# Patient Record
Sex: FEMALE | Race: WHITE
[De-identification: names, ages, dates, MRNs, and addresses within clinical notes are randomized per-mention and may not be internally consistent; named-entity substitution may affect disease eponyms.]

---

## 2017-08-30 ENCOUNTER — HOSPITAL ENCOUNTER (INPATIENT)
Dept: HOSPITAL 74 - JER | Age: 62
LOS: 4 days | Discharge: HOME | DRG: 247 | End: 2017-09-03
Attending: INTERNAL MEDICINE | Admitting: INTERNAL MEDICINE
Payer: COMMERCIAL

## 2017-08-30 VITALS — BODY MASS INDEX: 27.1 KG/M2

## 2017-08-30 DIAGNOSIS — E03.9: ICD-10-CM

## 2017-08-30 DIAGNOSIS — K56.60: Primary | ICD-10-CM

## 2017-08-30 DIAGNOSIS — I10: ICD-10-CM

## 2017-08-30 DIAGNOSIS — Z98.84: ICD-10-CM

## 2017-08-30 DIAGNOSIS — K21.9: ICD-10-CM

## 2017-08-30 DIAGNOSIS — E11.9: ICD-10-CM

## 2017-08-30 LAB
ALBUMIN SERPL-MCNC: 3.8 G/DL (ref 3.4–5)
ALP SERPL-CCNC: 64 U/L (ref 45–117)
ALT SERPL-CCNC: 23 U/L (ref 12–78)
ANION GAP SERPL CALC-SCNC: 6 MMOL/L (ref 8–16)
APPEARANCE UR: CLEAR
APTT BLD: 36.4 SECONDS (ref 26.9–34.4)
AST SERPL-CCNC: 13 U/L (ref 15–37)
BASOPHILS # BLD: 0.3 % (ref 0–2)
BILIRUB SERPL-MCNC: 0.3 MG/DL (ref 0.2–1)
BILIRUB UR STRIP.AUTO-MCNC: NEGATIVE MG/DL
CALCIUM SERPL-MCNC: 9.1 MG/DL (ref 8.5–10.1)
CK SERPL-CCNC: 143 IU/L (ref 26–192)
CO2 SERPL-SCNC: 30 MMOL/L (ref 21–32)
COLOR UR: YELLOW
CREAT SERPL-MCNC: 0.4 MG/DL (ref 0.55–1.02)
DEPRECATED RDW RBC AUTO: 12.7 % (ref 11.6–15.6)
EOSINOPHIL # BLD: 0.1 % (ref 0–4.5)
GLUCOSE SERPL-MCNC: 91 MG/DL (ref 74–106)
INR BLD: 1.11 (ref 0.82–1.09)
KETONES UR QL STRIP: NEGATIVE
LEUKOCYTE ESTERASE UR QL STRIP.AUTO: NEGATIVE
MCH RBC QN AUTO: 30.4 PG (ref 25.7–33.7)
MCHC RBC AUTO-ENTMCNC: 33.6 G/DL (ref 32–36)
MCV RBC: 90.3 FL (ref 80–96)
NEUTROPHILS # BLD: 79.9 % (ref 42.8–82.8)
NITRITE UR QL STRIP: NEGATIVE
PH UR: 5 [PH] (ref 5–8)
PLATELET # BLD AUTO: 303 K/MM3 (ref 134–434)
PMV BLD: 7.4 FL (ref 7.5–11.1)
PROT SERPL-MCNC: 7.3 G/DL (ref 6.4–8.2)
PROT UR QL STRIP: NEGATIVE
PROT UR QL STRIP: NEGATIVE
PT PNL PPP: 12.2 SEC (ref 9.98–11.88)
RBC # UR STRIP: NEGATIVE /UL
SP GR UR: 1.02 (ref 1–1.02)
TROPONIN I SERPL-MCNC: < 0.02 NG/ML (ref 0–0.05)
UROBILINOGEN UR STRIP-MCNC: NEGATIVE MG/DL (ref 0.2–1)
WBC # BLD AUTO: 10.9 K/MM3 (ref 4–10)

## 2017-08-30 PROCEDURE — 0D9670Z DRAINAGE OF STOMACH WITH DRAINAGE DEVICE, VIA NATURAL OR ARTIFICIAL OPENING: ICD-10-PCS | Performed by: INTERNAL MEDICINE

## 2017-08-30 NOTE — PDOC
*Physical Exam





- Vital Signs


 Last Vital Signs











Temp Pulse Resp BP Pulse Ox


 


 98.8 F   75   20   152/98   100 


 


 08/30/17 12:55  08/30/17 12:55  08/30/17 12:55  08/30/17 12:55  08/30/17 12:55














ED Treatment Course





- LABORATORY


CBC & Chemistry Diagram: 


 08/31/17 06:00





 08/31/17 06:00





- ADDITIONAL ORDERS


Additional order review: 


 Laboratory  Results











  08/30/17 08/30/17 08/30/17





  15:00 15:00 15:00


 


INR   


 


PTT (Actin FS)   


 


Sodium   139 


 


Potassium   4.3 


 


Chloride   103 


 


Carbon Dioxide   30 


 


Anion Gap   6 L 


 


BUN   11  D 


 


Creatinine   0.4 L 


 


Creat Clearance w eGFR   > 60 


 


Random Glucose   91 


 


Calcium   9.1 


 


Total Bilirubin   0.3  D 


 


AST   13 L 


 


ALT   23 


 


Alkaline Phosphatase   64 


 


Total Protein   7.3 


 


Albumin   3.8 


 


Urine Color    Yellow


 


Urine Appearance    Clear


 


Urine pH    5.0  D


 


Ur Specific Gravity    1.020


 


Urine Protein    Negative


 


Urine Glucose (UA)    Negative


 


Urine Ketones    Negative


 


Urine Blood    Negative


 


Urine Nitrite    Negative


 


Urine Bilirubin    Negative


 


Urine Urobilinogen    Negative


 


Ur Leukocyte Esterase    Negative


 


Blood Type  B POSITIVE  


 


Antibody Screen  Negative  














  08/30/17





  15:00


 


INR  1.11


 


PTT (Actin FS)  36.4 H


 


Sodium 


 


Potassium 


 


Chloride 


 


Carbon Dioxide 


 


Anion Gap 


 


BUN 


 


Creatinine 


 


Creat Clearance w eGFR 


 


Random Glucose 


 


Calcium 


 


Total Bilirubin 


 


AST 


 


ALT 


 


Alkaline Phosphatase 


 


Total Protein 


 


Albumin 


 


Urine Color 


 


Urine Appearance 


 


Urine pH 


 


Ur Specific Gravity 


 


Urine Protein 


 


Urine Glucose (UA) 


 


Urine Ketones 


 


Urine Blood 


 


Urine Nitrite 


 


Urine Bilirubin 


 


Urine Urobilinogen 


 


Ur Leukocyte Esterase 


 


Blood Type 


 


Antibody Screen 








 











  08/30/17





  15:00


 


RBC  3.91


 


MCV  90.3


 


MCHC  33.6


 


RDW  12.7


 


MPV  7.4 L


 


Neutrophils %  79.9  D


 


Lymphocytes %  16.3  D


 


Monocytes %  3.4 L


 


Eosinophils %  0.1  D


 


Basophils %  0.3














- Medications


Given in the ED: 


ED Medications














Discontinued Medications














Generic Name Dose Route Start Last Admin





  Trade Name Freq  PRN Reason Stop Dose Admin


 


Morphine Sulfate  2 mg 08/30/17 17:13 08/30/17 17:24





  Morphine Injection -  IVPUSH 08/30/17 17:14  2 mg





  ONCE ONE   Administration














Medical Decision Making





- Medical Decision Making


08/30/17 19:49


agree with NP's evaluation, assessment, and plan.





62 F with chronic abdominal pain, SBOs, presenting with acute on chronic 

abdominal pain.


- CTAP


- PO trial


- Likely dispo home if CT negative





Pt signed out to night team, pending CTAP and PO trial





*DC/Admit/Observation/Transfer


Diagnosis at time of Disposition: 


 SBO (small bowel obstruction)





- Discharge Dispostion


Condition at time of disposition: Fair

## 2017-08-30 NOTE — PDOC
History of Present Illness





<Judy Pisano - Last Filed: 08/30/17 21:19>





- General


History Source: Patient


Exam Limitations: No Limitations





- History of Present Illness


Initial Comments: 


08/30/17 17:24











Chief complaint: Abdominal pain generalized








History of present illness: Patient is a 62-year-old female with a history of 

GERD, hypertension, diabetes, small bowel obstruction approximately 15 years 

ago. Patient reports that she was awaken this morning around 5 AM with mid 

abdominal discomfort that quickly started to radiate to entire abdomen with a 

cramping like sensation. Patient denies any fever, or chills. Patient reports 

that her last bowel movement was today to moderate sized brown formed with no 

rectal bleeding or blood noted on stool. Patient denies any urinary symptoms. 

Patient denies any back pain. Patient denies any difficulty breathing. Patient 

reports that her appetite has been very good.





08/30/17 17:28





Medical h/o gerd, htn, diabetes, small bowel obstruction 15 years ago


surgical h/o: thyroidectomy, cholecystectomy, appendectomy, hystertomy, 

oophorectomy, b/l hip replacement


GI: Dr Hayley pugh seen 5 years ago


PCP: Dr. Evie Hodge 





08/30/17 19:18





Timing/Duration: getting worse


Severity: severe (diffuse )


Associated Symptoms: reports: nausea/vomiting (nausea slight ).  denies: chest 

pain, cough, diaphoresis, fever/chills, headaches, malaise, seizure, shortness 

of breath, syncope, weakness





<Yanna Alexandre - Last Filed: 08/31/17 12:08>





- General


Chief Complaint: Pain


Stated Complaint: ABD PAIN


Time Seen by Provider: 08/30/17 16:51





Past History





<Judy Pisano - Last Filed: 08/30/17 21:19>





- Past Medical History


Diabetes: Yes


GI Disorders: Yes (gerd)


HTN: Yes


Suicide Attempt (Hx): No


Thyroid Disease: Yes





- Surgical History


Abdominal Surgery: Yes


Appendectomy: Yes


Cholecystectomy: Yes





- Psycho/Social/Smoking Cessation Hx


Anxiety: No


Suicidal Ideation: No


Smoking Status: No


Smoking History: Never smoked


Number of Cigarettes Smoked Daily: 0


Hx Alcohol Use: Yes (SOCIAL)


Drug/Substance Use Hx: No


Substance Use Type: None





<Yanna Alexandre - Last Filed: 08/31/17 12:08>





- Past Medical History


Allergies/Adverse Reactions: 


 Allergies











Allergy/AdvReac Type Severity Reaction Status Date / Time


 


No Known Allergies Allergy   Verified 08/30/17 12:58











Home Medications: 


Ambulatory Orders





Levothyroxine [Synthroid -] 100 mcg PO DAILY 08/30/17 


Tramadol HCl 50 mg PO DAILY PRN 08/30/17 


Valsartan [Diovan] 80 mg PO DAILY 08/30/17 











**Review of Systems





- Review of Systems


Able to Perform ROS?: Yes


Constitutional: No: Symptoms Reported


HEENTM: No: Symptoms Reported


Respiratory: No: Symptoms reported


Cardiac (ROS): No: Symptoms Reported


ABD/GI: Yes: Nausea (slight today ), Abdominal cramping (diffuse), Other (mid 

epigastric discomfort starting at 5 am started to radiate diffusely with 

cramping generalized abdominal pain).  No: Abdominal Distended, Abd. Pain w/ 

defecation, Blood Streaked Bowels, Constipated, Diarrhea, Difficulty Swallowing

, Poor Appetite, Poor Fluid Intake, Rectal Bleeding, Vomiting, Indigestion, 

Tarry Stools


: No: Symptoms Reported


Musculoskeletal: No: Symptoms Reported


Integumentary: No: Symptoms Reported


Neurological: No: Symptoms reported





<Yanna Alexandre - Last Filed: 08/31/17 12:08>





*Physical Exam





- Vital Signs


 Last Vital Signs











Temp Pulse Resp BP Pulse Ox


 


 97.3 F L  57 L  16   153/79   99 


 


 08/30/17 17:59  08/30/17 17:59  08/30/17 17:59  08/30/17 17:59  08/30/17 17:59














<Judy Pisano - Last Filed: 08/30/17 21:19>





- Vital Signs


 Last Vital Signs











Temp Pulse Resp BP Pulse Ox


 


 98.8 F   75   20   152/98   100 


 


 08/30/17 12:55  08/30/17 12:55  08/30/17 12:55  08/30/17 12:55  08/30/17 12:55














- Physical Exam


General Appearance: Yes: Appropriately Dressed


Respiratory/Chest: positive: Lungs Clear, Normal Breath Sounds.  negative: 

Chest Tender, Respiratory Distress


Cardiovascular: positive: Regular Rhythm, Regular Rate, S1, S2


Gastrointestinal/Abdominal: positive: Normal Bowel Sounds, Tender (diffusely 

greater left lower quadrant), Soft, Tenderness.  negative: Organomegaly, 

Increased Bowel Sounds, Decreased BS, Distended, Guarding, Rebound, Hepatomegaly

, Spleenomegaly


Musculoskeletal: negative: CVA Tenderness, CVA Tenderness (R), CVA Tenderness (L

)


Integumentary: positive: Normal Color


Neurologic: positive: Alert, Normal Response, Finger to Nose





<Yanna Alexandre - Last Filed: 08/31/17 12:08>





ED Treatment Course





- LABORATORY


CBC & Chemistry Diagram: 


 08/30/17 15:00





 08/30/17 15:00





- ADDITIONAL ORDERS


Additional order review: 


 Laboratory  Results











  08/30/17 08/30/17 08/30/17





  17:38 15:00 15:00


 


INR   


 


PTT (Actin FS)   


 


Sodium    139


 


Potassium    4.3


 


Chloride    103


 


Carbon Dioxide    30


 


Anion Gap    6 L


 


BUN    11  D


 


Creatinine    0.4 L


 


Creat Clearance w eGFR    > 60


 


Random Glucose    91


 


Calcium    9.1


 


Total Bilirubin    0.3  D


 


AST    13 L


 


ALT    23


 


Alkaline Phosphatase    64


 


Creatine Kinase  143  


 


Troponin I  < 0.02  


 


Total Protein    7.3


 


Albumin    3.8


 


Urine Color   


 


Urine Appearance   


 


Urine pH   


 


Ur Specific Gravity   


 


Urine Protein   


 


Urine Glucose (UA)   


 


Urine Ketones   


 


Urine Blood   


 


Urine Nitrite   


 


Urine Bilirubin   


 


Urine Urobilinogen   


 


Ur Leukocyte Esterase   


 


Blood Type   B POSITIVE 


 


Antibody Screen   Negative 














  08/30/17 08/30/17





  15:00 15:00


 


INR   1.11


 


PTT (Actin FS)   36.4 H


 


Sodium  


 


Potassium  


 


Chloride  


 


Carbon Dioxide  


 


Anion Gap  


 


BUN  


 


Creatinine  


 


Creat Clearance w eGFR  


 


Random Glucose  


 


Calcium  


 


Total Bilirubin  


 


AST  


 


ALT  


 


Alkaline Phosphatase  


 


Creatine Kinase  


 


Troponin I  


 


Total Protein  


 


Albumin  


 


Urine Color  Yellow 


 


Urine Appearance  Clear 


 


Urine pH  5.0  D 


 


Ur Specific Gravity  1.020 


 


Urine Protein  Negative 


 


Urine Glucose (UA)  Negative 


 


Urine Ketones  Negative 


 


Urine Blood  Negative 


 


Urine Nitrite  Negative 


 


Urine Bilirubin  Negative 


 


Urine Urobilinogen  Negative 


 


Ur Leukocyte Esterase  Negative 


 


Blood Type  


 


Antibody Screen  








 











  08/30/17





  15:00


 


RBC  3.91


 


MCV  90.3


 


MCHC  33.6


 


RDW  12.7


 


MPV  7.4 L


 


Neutrophils %  79.9  D


 


Lymphocytes %  16.3  D


 


Monocytes %  3.4 L


 


Eosinophils %  0.1  D


 


Basophils %  0.3














- Medications


Given in the ED: 


ED Medications














Discontinued Medications














Generic Name Dose Route Start Last Admin





  Trade Name Freq  PRN Reason Stop Dose Admin


 


Morphine Sulfate  2 mg 08/30/17 17:13 08/30/17 17:24





  Morphine Injection -  IVPUSH 08/30/17 17:14  2 mg





  ONCE ONE   Administration














<Judy Pisano - Last Filed: 08/30/17 21:19>





- LABORATORY


CBC & Chemistry Diagram: 


 08/31/17 06:00





 08/31/17 06:00





- ADDITIONAL ORDERS


Additional order review: 


 Laboratory  Results











  08/30/17 08/30/17 08/30/17





  15:00 15:00 15:00


 


INR    1.11


 


PTT (Actin FS)    36.4 H


 


Sodium  139  


 


Potassium  4.3  


 


Chloride  103  


 


Carbon Dioxide  30  


 


Anion Gap  6 L  


 


BUN  11  D  


 


Creatinine  0.4 L  


 


Creat Clearance w eGFR  > 60  


 


Random Glucose  91  


 


Calcium  9.1  


 


Total Bilirubin  0.3  D  


 


AST  13 L  


 


ALT  23  


 


Alkaline Phosphatase  64  


 


Total Protein  7.3  


 


Albumin  3.8  


 


Urine Color   Yellow 


 


Urine Appearance   Clear 


 


Urine pH   5.0  D 


 


Urine Protein   Negative 


 


Urine Glucose (UA)   Negative 


 


Urine Ketones   Negative 


 


Urine Blood   Negative 


 


Urine Nitrite   Negative 


 


Urine Bilirubin   Negative 


 


Urine Urobilinogen   Negative 


 


Ur Leukocyte Esterase   Negative 








 











  08/30/17





  15:00


 


RBC  3.91


 


MCV  90.3


 


MCHC  33.6


 


RDW  12.7


 


MPV  7.4 L


 


Neutrophils %  79.9  D


 


Lymphocytes %  16.3  D


 


Monocytes %  3.4 L


 


Eosinophils %  0.1  D


 


Basophils %  0.3














<Yanna Alexandre - Last Filed: 08/31/17 12:08>





Medical Decision Making





- Medical Decision Making


08/30/17 17:28


 Patient is a 62-year-old female with a history of GERD, hypertension, diabetes

, small bowel obstruction approximately 15 years ago. Patient reports that she 

was awaken this morning around 5 AM with mid abdominal discomfort that quickly 

started to radiate to entire abdomen with a cramping like sensation. Patient 

denies any fever, or chills. Patient reports that her last bowel movement was 

today to moderate sized brown formed with no rectal bleeding or blood noted on 

stool. Patient denies any urinary symptoms. Patient denies any back pain. 

Patient denies any difficulty breathing. Patient reports that her appetite has 

been very good. Differential diagnosis will be r/o small bowel obstruction, 

diverticulitis  








Diffuse abdominal pain r/o SMALL BOWEL OBSTRUCTION/ DIVERTICULITIS 











PLAN: 


cbc with diff 


cmp


u/a 


Pt/INR


cardiac profile


EKG reviewed by Dr. Flores





CT abdomen with po and IV contrast


morphine  2m IV push 


IV insert





08/30/17 17:29








08/30/17 17:33








08/30/17 17:33


 Laboratory Tests











  08/30/17 08/30/17 08/30/17





  15:00 15:00 15:00


 


WBC  10.9 H D  


 


RBC  3.91  


 


Hgb  11.9  


 


Hct  35.3  


 


MCV  90.3  


 


MCH  30.4  


 


MCHC  33.6  


 


RDW  12.7  


 


Plt Count  303  


 


MPV  7.4 L  


 


Neutrophils %  79.9  D  


 


Lymphocytes %  16.3  D  


 


Monocytes %  3.4 L  


 


Eosinophils %  0.1  D  


 


Basophils %  0.3  


 


INR   1.11 


 


PTT (Actin FS)   36.4 H 


 


Sodium   


 


Potassium   


 


Chloride   


 


Carbon Dioxide   


 


Anion Gap   


 


BUN   


 


Creatinine   


 


Creat Clearance w eGFR   


 


Random Glucose   


 


Calcium   


 


Total Bilirubin   


 


ALT   


 


Alkaline Phosphatase   


 


Total Protein   


 


Albumin   


 


Urine Color    Yellow


 


Urine Appearance    Clear


 


Urine pH    5.0  D


 


Urine Protein    Negative


 


Urine Glucose (UA)    Negative


 


Urine Ketones    Negative


 


Urine Blood    Negative


 


Urine Nitrite    Negative


 


Urine Bilirubin    Negative


 


Urine Urobilinogen    Negative


 


Ur Leukocyte Esterase    Negative














  08/30/17





  15:00


 


WBC 


 


RBC 


 


Hgb 


 


Hct 


 


MCV 


 


MCH 


 


MCHC 


 


RDW 


 


Plt Count 


 


MPV 


 


Neutrophils % 


 


Lymphocytes % 


 


Monocytes % 


 


Eosinophils % 


 


Basophils % 


 


INR 


 


PTT (Actin FS) 


 


Sodium  139


 


Potassium  4.3


 


Chloride  103


 


Carbon Dioxide  30


 


Anion Gap  6 L


 


BUN  11  D


 


Creatinine  0.4 L


 


Creat Clearance w eGFR  > 60


 


Random Glucose  91


 


Calcium  9.1


 


Total Bilirubin  0.3  D


 


ALT  23


 


Alkaline Phosphatase  64


 


Total Protein  7.3


 


Albumin  3.8


 


Urine Color 


 


Urine Appearance 


 


Urine pH 


 


Urine Protein 


 


Urine Glucose (UA) 


 


Urine Ketones 


 


Urine Blood 


 


Urine Nitrite 


 


Urine Bilirubin 


 


Urine Urobilinogen 


 


Ur Leukocyte Esterase 














08/30/17 19:00


 Laboratory Tests











  08/30/17





  17:38


 


Creatine Kinase  143


 


Troponin I  < 0.02














08/30/17 19:20


pt. signed out to OSWALDO Pisano CT of abdomen pending 





08/31/17 12:08








<Yanna Alexandre - Last Filed: 08/31/17 12:08>





*DC/Admit/Observation/Transfer





- Discharge Dispostion


Admit: Yes





<Judy Pisano - Last Filed: 08/30/17 21:19>





<Yanna Alexandre - Last Filed: 08/31/17 12:08>


Diagnosis at time of Disposition: 


 SBO (small bowel obstruction)





- Discharge Dispostion


Condition at time of disposition: Fair

## 2017-08-31 LAB
ALBUMIN SERPL-MCNC: 3.2 G/DL (ref 3.4–5)
ALP SERPL-CCNC: 69 U/L (ref 45–117)
ALT SERPL-CCNC: 23 U/L (ref 12–78)
ANION GAP SERPL CALC-SCNC: 6 MMOL/L (ref 8–16)
AST SERPL-CCNC: 13 U/L (ref 15–37)
BASOPHILS # BLD: 0.3 % (ref 0–2)
BILIRUB SERPL-MCNC: 0.6 MG/DL (ref 0.2–1)
CALCIUM SERPL-MCNC: 8.2 MG/DL (ref 8.5–10.1)
CO2 SERPL-SCNC: 30 MMOL/L (ref 21–32)
CREAT SERPL-MCNC: 0.4 MG/DL (ref 0.55–1.02)
DEPRECATED RDW RBC AUTO: 12.9 % (ref 11.6–15.6)
EOSINOPHIL # BLD: 0.9 % (ref 0–4.5)
GLUCOSE SERPL-MCNC: 91 MG/DL (ref 74–106)
MCH RBC QN AUTO: 30.4 PG (ref 25.7–33.7)
MCHC RBC AUTO-ENTMCNC: 33.6 G/DL (ref 32–36)
MCV RBC: 90.3 FL (ref 80–96)
NEUTROPHILS # BLD: 69 % (ref 42.8–82.8)
PLATELET # BLD AUTO: 283 K/MM3 (ref 134–434)
PMV BLD: 6.9 FL (ref 7.5–11.1)
PROT SERPL-MCNC: 6.4 G/DL (ref 6.4–8.2)
WBC # BLD AUTO: 7.4 K/MM3 (ref 4–10)

## 2017-08-31 RX ADMIN — HYDROMORPHONE HYDROCHLORIDE PRN MG: 1 INJECTION, SOLUTION INTRAMUSCULAR; INTRAVENOUS; SUBCUTANEOUS at 00:08

## 2017-08-31 RX ADMIN — SODIUM CHLORIDE SCH MLS/HR: 9 INJECTION, SOLUTION INTRAVENOUS at 00:08

## 2017-08-31 RX ADMIN — HYDROMORPHONE HYDROCHLORIDE PRN MG: 1 INJECTION, SOLUTION INTRAMUSCULAR; INTRAVENOUS; SUBCUTANEOUS at 06:08

## 2017-08-31 NOTE — EKG
Test Reason : 

Blood Pressure : ***/*** mmHG

Vent. Rate : 061 BPM     Atrial Rate : 061 BPM

   P-R Int : 192 ms          QRS Dur : 098 ms

    QT Int : 424 ms       P-R-T Axes : -06 -07 015 degrees

   QTc Int : 426 ms

 

NORMAL SINUS RHYTHM

SEPTAL INFARCT , AGE UNDETERMINED

ABNORMAL ECG

WHEN COMPARED WITH ECG OF 19-MAY-2012 09:04,

SEPTAL INFARCT IS NOW PRESENT

Confirmed by CARLOS DUPREE, KEVIN ([id number]) on 8/31/2017 11:13:23 AM

 

Referred By:             Confirmed By:KEVIN GONZALEZ MD

## 2017-08-31 NOTE — PN
Teaching Attending Note





ATTENDING PHYSICIAN STATEMENT





I saw and evaluated the patient.


I reviewed the resident's note and discussed the case with the resident.


I agree with the resident's findings and plan as documented.








SUBJECTIVE:








OBJECTIVE:








ASSESSMENT AND PLAN:

## 2017-08-31 NOTE — HP
CHIEF COMPLAINT: Abdominal discomfort





PCP:Dr. Evie Hodge 





HISTORY OF PRESENT ILLNESS:


62 y.o. F with pmh of GERD, HTN, SBO 15 years prior presenting with abdominal 

discomfort. Patient woke up at 5 am with epigastric pain. She drank black 

coffee and then around 9 am, she felt her pain radiate across entire abdomen. 

Patient states the pain is crampy and constant. Patient felt it was the same as 

when she had a SBO 15 years ago. Her las BM was this morning x2. Her last meal 

was last night for dinner. She has a good appetite. She endorses chills, mild 

nausea. Denies fever, vomiting, diarrhea, constipation, changes in her bowels 

or bladder





ER course was notable for:


(1) WBC - 10.9, UA negative


(2) Abd/pelvis CT- dilated loops of small bowel-- SBO


(3) NG tube placement





Recent Travel: denies





PAST MEDICAL HISTORY: GERD, HTN, SBO 15 years prior





PAST SURGICAL HISTORY: thyroidectomy, appendectomy, cholecystectomy, total 

hysterectomy with oopherectomy, and b/l hip replacements





Social History:


Smoking: denies


Alcohol: occasional


Drugs: denies





Family History: HTN in mother


Allergies





No Known Allergies Allergy (Verified 08/30/17 12:58)


 








HOME MEDICATIONS:


 Home Medications











 Medication  Instructions  Recorded


 


Levothyroxine [Synthroid -] 100 mcg PO DAILY 08/30/17


 


Tramadol HCl 50 mg PO DAILY PRN 08/30/17


 


Valsartan [Diovan] 80 mg PO DAILY 08/30/17








REVIEW OF SYSTEMS


CONSTITUTIONAL: 


Absent:  fever, chills, diaphoresis, generalized weakness, malaise, loss of 

appetite, weight change


HEENT: 


Absent:  rhinorrhea, nasal congestion, throat pain, throat swelling, difficulty 

swallowing, mouth swelling, ear pain, eye pain, visual changes


CARDIOVASCULAR: 


Absent: chest pain, syncope, palpitations, irregular heart rate, lightheadedness

, peripheral edema


RESPIRATORY: 


Absent: cough, shortness of breath, dyspnea with exertion, orthopnea, wheezing, 

stridor, hemoptysis


GASTROINTESTINAL:


Absent: abdominal pain, abdominal distension, nausea, vomiting, diarrhea, 

constipation, melena, hematochezia


GENITOURINARY: 


Absent: dysuria, frequency, urgency, hesitancy, hematuria, flank pain, genital 

pain


MUSCULOSKELETAL: 


Absent: myalgia, arthralgia, joint swelling, back pain, neck pain


SKIN: 


Absent: rash, itching, pallor


HEMATOLOGIC/IMMUNOLOGIC: 


Absent: easy bleeding, easy bruising, lymphadenopathy, frequent infections


ENDOCRINE:


Absent: unexplained weight gain, unexplained weight loss, heat intolerance, 

cold intolerance


NEUROLOGIC: 


Absent: headache, focal weakness or paresthesias, dizziness, unsteady gait, 

seizure, mental status changes, bladder or bowel incontinence


PSYCHIATRIC: 


Absent: anxiety, depression, suicidal or homicidal ideation, hallucinations.








PHYSICAL EXAMINATION


 Vital Signs - 24 hr











  08/30/17





  23:14


 


Temperature 98.3 F


 


Pulse Rate 55 L


 


Respiratory 20





Rate 


 


Blood Pressure 151/74


 


O2 Sat by Pulse 96





Oximetry (%) 











GENERAL: Awake, alert, and fully oriented, in no acute distress.


HEAD: Normal with no signs of trauma.


EYES: Pupils equal, round and reactive to light, extraocular movements intact, 

sclera anicteric, conjunctiva clear. No lid lag.


EARS, NOSE, THROAT: Ears normal, nares patent, oropharynx clear without 

exudates. Moist mucous membranes.


NECK: Normal range of motion, supple without lymphadenopathy, JVD, or masses.


LUNGS: Breath sounds equal, clear to auscultation bilaterally. No wheezes, and 

no crackles. No accessory muscle use.


HEART: Regular rate and rhythm, normal S1 and S2 without murmur, rub or gallop.


ABDOMEN: Soft, Diffuse tenderness to palpation and percussion, not distended, 

normoactive bowel sounds, no guarding, no rebound, no masses.  No hepatomegaly 

or  splenomegaly. 


MUSCULOSKELETAL: Normal range of motion at all joints. No bony deformities or 

tenderness. No CVA tenderness.


UPPER EXTREMITIES: 2+ pulses, warm, well-perfused. No cyanosis. No clubbing. No 

peripheral edema.


LOWER EXTREMITIES: 2+ pulses, warm, well-perfused. No calf tenderness. No 

peripheral edema. 


NEUROLOGICAL:  Cranial nerves II-XII intact. Normal speech. Normal gait.


PSYCHIATRIC: Cooperative. Good eye contact. Appropriate mood and affect.


SKIN: Warm, dry, normal turgor, no rashes or lesions noted, normal capillary 

refill. 


 Laboratory Results - last 24 hr











  08/30/17





  22:50


 


Lactic Acid  0.6











ASSESSMENT/PLAN:


62 year old F with pmh of GERD, HTN, SBO 15 yrs ago presented with diffuse 

abdominal pain admitted for SBO.





#SBO


-IVF @ 125 cc/hr NS


-NPO


-0.5 mg dilaudid Q6 hr PRN for pain control


-Zofran 4 mg Q6 hr PRN


-GI consulted, Dr. Hardy


-NG tube in place to low intermittent suction





#HTN


-Lopressor 5 mg IVP q6h prn, maintain sbp <140


-Hold home medications





#FEN/GI


-IVF @ 125 cc/hr NS


-wnl


-NPO





#ppx


-DVT- SCD's





Visit type





- Emergency Visit


Emergency Visit: Yes


ED Registration Date: 08/30/17


Care time: The patient presented to the Emergency Department on the above date 

and was hospitalized for further evaluation of their emergent condition.





- New Patient


This patient is new to me today: Yes


Date on this admission: 09/03/17





- Critical Care


Critical Care patient: No

## 2017-08-31 NOTE — PN
Progress Note, Physician


Chief Complaint: 


Ms Melissa says she is feeling a little bit better. Says her abdominal pain 

is improved but still present. No cp or sob. +flatus this am.





- Current Medication List


Current Medications: 


Active Medications





Hydromorphone HCl (Dilaudid Injection -)  0.5 mg IVPUSH Q6H PRN


   PRN Reason: PAIN


   Last Admin: 08/31/17 06:08 Dose:  0.5 mg


Sodium Chloride (Normal Saline -)  1,000 mls @ 125 mls/hr IV ASDIR CATHIE


   Last Admin: 08/31/17 00:08 Dose:  125 mls/hr


Metoprolol Tartrate (Lopressor Injection -)  5 mg IVPB Q6H PRN


   PRN Reason: HYPERTENSION


Ondansetron HCl (Zofran Injection)  4 mg IVPUSH Q6H PRN


   PRN Reason: NAUSEA AND/OR VOMITING











- Objective


Vital Signs: 


 Vital Signs











Temperature  37.1 C   08/31/17 09:58


 


Pulse Rate  62   08/31/17 09:58


 


Respiratory Rate  20   08/31/17 09:58


 


Blood Pressure  148/71   08/31/17 09:58


 


O2 Sat by Pulse Oximetry (%)  96   08/30/17 23:14











Constitutional: Yes: Well Nourished, No Distress, Calm


Cardiovascular: Yes: Regular Rate and Rhythm.  No: Gallop, Murmur, Rub


Respiratory: Yes: Regular, CTA Bilaterally.  No: Rales, Rhonchi, Wheezes


Gastrointestinal: Yes: Soft, Hypoactive Bowel Sounds, Tenderness.  No: Normal 

Bowel Sounds, Distention


Extremities: Yes: WNL


Edema: No


Labs: 


 CBC, BMP





 08/31/17 06:00 





 08/31/17 06:00 





 INR, PTT











INR  1.11  (0.82-1.09)   08/30/17  15:00    














Problem List





- Problems


(1) SBO (small bowel obstruction)


Assessment/Plan: 


-patient slightly improved


-continue NPO


-continue NGT to LIWS


-case d/w surgery, no need for surgical intervention at this time


-GI consulted


Code(s): K56.69 - OTHER INTESTINAL OBSTRUCTION





(2) Hypertension


Assessment/Plan: 


-prn IV metoprolol while npo


-restart home regimen when placed on diet


Code(s): I10 - ESSENTIAL (PRIMARY) HYPERTENSION   





(3) Hypothyroid


Assessment/Plan: 


-ok to hold synthroid for short time secondary to long half life


-if long period where needs to be npo, start IV levothyroxine


Code(s): E03.9 - HYPOTHYROIDISM, UNSPECIFIED

## 2017-08-31 NOTE — CONSULT
- Consultation


REQUESTING PROVIDER: 





CONSULT REQUEST: We have been asked to surgically evaluate this patient for 

abdominal pain


PCP:Jacky Umana MD





HISTORY OF PRESENT ILLNESS: CTSP for evaluation and management of SBO; patient 

has had # abdominal surgeries in the past; she came in b/o crampy abdominal pain

; she states she is passing flatus.





PMHx:    hypothyroid; hypertension      





PSHx:     open martha; appendectomy; sleeve gastrectomy; DOMO; incisional hernia 

repair; C-S; b/l THR and previous h/o SBO  


 Home Medications











 Medication  Instructions  Recorded


 


Levothyroxine [Synthroid -] 100 mcg PO DAILY 08/30/17


 


Tramadol HCl 50 mg PO DAILY PRN 08/30/17


 


Valsartan [Diovan] 80 mg PO DAILY 08/30/17








 Allergies











Allergy/AdvReac Type Severity Reaction Status Date / Time


 


No Known Allergies Allergy   Verified 08/30/17 12:58











PHYSICAL EXAM:


GENERAL: Awake, alert, and fully oriented, in no acute distress.


HEAD: Normal with no signs of trauma.


EYES: PERRL, sclera anicteric, conjunctiva clear.NECK: Normal ROM, supple 

without lymphadenopathy, JVD, or masses.


ABDOMEN: Soft, nontender, not distended, hypooactive bowel sounds, no guarding, 

no rebound, no masses.  No organomegaly. No hernias; healed surgical scars


MUSCULOSKELETAL: Normal ROM at all joints. No bony deformities or tenderness. 

No CVA tenderness.


UPPER EXTREMITIES: 2+ pulses, warm, well-perfused. No cyanosis. Cap refill <2 

seconds. No peripheral edema.


LOWER EXTREMITIES: 2+ pulses, warm, well-perfused. No calf tenderness. No 

peripheral edema. 


NEUROLOGICAL: Normal speech, gait not observed.


PSYCH: Cooperative. Good eye contact. Appropriate mood and affect.


SKIN: Warm, dry, normal turgor, no rashes or lesions noted.


 Vital Signs











Temperature  98.7 F   08/31/17 09:58


 


Pulse Rate  62   08/31/17 09:58


 


Respiratory Rate  20   08/31/17 09:58


 


Blood Pressure  148/71   08/31/17 09:58


 


O2 Sat by Pulse Oximetry (%)  96   08/30/17 23:14








 Lab Results











WBC  7.4 K/mm3 (4.0-10.0)  D 08/31/17  06:00    


 


RBC  3.73 M/mm3 (3.60-5.2)   08/31/17  06:00    


 


Hgb  11.3 GM/dL (10.7-15.3)   08/31/17  06:00    


 


Hct  33.7 % (32.4-45.2)   08/31/17  06:00    


 


MCV  90.3 fl (80-96)   08/31/17  06:00    


 


MCHC  33.6 g/dl (32.0-36.0)   08/31/17  06:00    


 


RDW  12.9 % (11.6-15.6)   08/31/17  06:00    


 


Plt Count  283 K/MM3 (134-434)   08/31/17  06:00    


 


Sodium  142 mmol/L (136-145)   08/31/17  06:00    


 


Potassium  3.7 mmol/L (3.5-5.1)   08/31/17  06:00    


 


Chloride  106 mmol/L ()   08/31/17  06:00    


 


Carbon Dioxide  30 mmol/L (21-32)   08/31/17  06:00    


 


Anion Gap  6  (8-16)  L  08/31/17  06:00    


 


BUN  7 mg/dL (7-18)  D 08/31/17  06:00    


 


Creatinine  0.4 mg/dL (0.55-1.02)  L  08/31/17  06:00    


 


Random Glucose  91 mg/dL ()   08/31/17  06:00    


 


Calcium  8.2 mg/dL (8.5-10.1)  L  08/31/17  06:00    


 


Blood Type  B POSITIVE   08/30/17  15:00    


 


Antibody Screen  Negative   08/30/17  15:00    


 


INR  1.11  (0.82-1.09)   08/30/17  15:00    








CT a/p reviewed





IMP: partial vs. early SBO





PLAN: NPO/NGT/IVF; serial abdominal exams; f/u AXR ordered; plan of care d/w 

patient; surgery is possibly necessary if no resolution of sbo w/ conservative 

tx.; a/a/u by the patient.











Ricky Nathan MD FACS





Visit type





- Case Type


Case Type: ED Admission





- Emergency


Emergency Visit: Yes


ED Registration Date: 08/30/17


Care time: The patient presented to the Emergency Department on the above date 

and was hospitalized for further evaluation of their emergent condition.





- New patient


This patient is new to me today: Yes


Date on this admission: 08/31/17





- Critical Care


Critical Care patient: No

## 2017-09-01 LAB
ANION GAP SERPL CALC-SCNC: 12 MMOL/L (ref 8–16)
BASOPHILS # BLD: 0.3 % (ref 0–2)
CALCIUM SERPL-MCNC: 8.2 MG/DL (ref 8.5–10.1)
CO2 SERPL-SCNC: 24 MMOL/L (ref 21–32)
CREAT SERPL-MCNC: 0.3 MG/DL (ref 0.55–1.02)
DEPRECATED RDW RBC AUTO: 12.8 % (ref 11.6–15.6)
EOSINOPHIL # BLD: 0.7 % (ref 0–4.5)
GLUCOSE SERPL-MCNC: 60 MG/DL (ref 74–106)
MAGNESIUM SERPL-MCNC: 1.8 MG/DL (ref 1.8–2.4)
MCH RBC QN AUTO: 30.2 PG (ref 25.7–33.7)
MCHC RBC AUTO-ENTMCNC: 33.4 G/DL (ref 32–36)
MCV RBC: 90.6 FL (ref 80–96)
NEUTROPHILS # BLD: 71.8 % (ref 42.8–82.8)
PHOSPHATE SERPL-MCNC: 3.2 MG/DL (ref 2.5–4.9)
PLATELET # BLD AUTO: 263 K/MM3 (ref 134–434)
PMV BLD: 7.3 FL (ref 7.5–11.1)
WBC # BLD AUTO: 8.1 K/MM3 (ref 4–10)

## 2017-09-01 RX ADMIN — SODIUM CHLORIDE SCH MLS/HR: 9 INJECTION, SOLUTION INTRAVENOUS at 03:34

## 2017-09-01 RX ADMIN — SODIUM CHLORIDE SCH MLS/HR: 9 INJECTION, SOLUTION INTRAVENOUS at 12:10

## 2017-09-01 RX ADMIN — SODIUM CHLORIDE SCH MLS/HR: 9 INJECTION, SOLUTION INTRAVENOUS at 20:03

## 2017-09-01 NOTE — CON.GI
Consult


Consult Specialty:: GI


Referred by:: Dr Umana


Reason for Consultation:: SBO





- History of Present Illness


History of Present Illness: 


62-year-old female with a history of GERD, hypertension, diabetes, small bowel 

obstruction approximately 15 years ago s/p gastric sleeve surgery 5 years ago 

now with resolving SBO. On admission, she was distended and had abdominal pain. 

Radiology showed SBO. At this time, problem has resolved, NGT is out, and she 

is tolerating clear liquids.








- History Source


History Provided By: Patient, Medical Record


Limitations to Obtaining History: No Limitations





- Past Medical History


...Pregnant: No





- Alcohol/Substance Use


Hx Alcohol Use: Yes (SOCIAL)





- Smoking History


Smoking history: Never smoked


Have you smoked in the past 12 months: No


Aproximately how many cigarettes per day: 0





Home Medications





- Allergies


Allergies/Adverse Reactions: 


 Allergies











Allergy/AdvReac Type Severity Reaction Status Date / Time


 


No Known Allergies Allergy   Verified 08/30/17 12:58














- Home Medications


Home Medications: 


Ambulatory Orders





Levothyroxine [Synthroid -] 100 mcg PO DAILY 08/30/17 


Tramadol HCl 50 mg PO DAILY PRN 08/30/17 


Valsartan [Diovan] 80 mg PO DAILY 08/30/17 











Physical Exam-GI


Vital Signs: 


 Vital Signs











Temperature  99.1 F   09/01/17 13:56


 


Pulse Rate  61   09/01/17 13:56


 


Respiratory Rate  18   09/01/17 13:56


 


Blood Pressure  135/62   09/01/17 13:56


 


O2 Sat by Pulse Oximetry (%)  98   09/01/17 09:00











Constitutional: Yes: Well Nourished, No Distress


Neck: Yes: Supple


Cardiovascular: Yes: Regular Rate and Rhythm


Respiratory: Yes: CTA Bilaterally


Gastrointestinal Inspection: Yes: WNL


...Auscultate: Yes: Normoactive Bowel Sounds


...Palpate: Yes: Soft.  No: Tenderness


Labs: 


 CBC, BMP





 09/01/17 06:00 





 09/01/17 06:00 





 INR, PTT











INR  1.11  (0.82-1.09)   08/30/17  15:00    














Imaging





- Results


X-ray: Report Reviewed (resolving SBO)





Assessment/Plan


62-F with SBO now resolvingl, presumably secondary to adhesions. 


Advance to full liquid. 


If tolerated can d/c with f/u with Dr Warren

## 2017-09-01 NOTE — PN
Progress Note (short form)





- Note


Progress Note: 


Attending Surgeon





No c/o; passing flatus and having bowel movements





VSS AF abdomen-soft; flat and non tender; no tympany; o/w negative





AXR yesterday-contrast in colon





IMP: resolvong partial SBO





PLAN: NGT removed; trial of clear liquids and advance diet as tolerated.





Ricky Nathan MD FACS

## 2017-09-01 NOTE — PN
Progress Note, Physician


Chief Complaint: 


Ms Melissa says she is feeling much better. NGT removed and patient 

tolerating clear liquid diet. +flatus and +bm. No cp, sob, n/v.





- Current Medication List


Current Medications: 


Active Medications





Hydromorphone HCl (Dilaudid Injection -)  0.5 mg IVPUSH Q6H PRN


   PRN Reason: PAIN


   Last Admin: 08/31/17 06:08 Dose:  0.5 mg


Sodium Chloride (Normal Saline -)  1,000 mls @ 125 mls/hr IV ASDIR [facility]


   Last Admin: 09/01/17 12:10 Dose:  125 mls/hr


Metoprolol Tartrate (Lopressor Injection -)  5 mg IVPB Q6H PRN


   PRN Reason: HYPERTENSION


Ondansetron HCl (Zofran Injection)  4 mg IVPB Q6H PRN


   PRN Reason: NAUSEA AND/OR VOMITING











- Objective


Vital Signs: 


 Vital Signs











Temperature  37.3 C   09/01/17 13:56


 


Pulse Rate  61   09/01/17 13:56


 


Respiratory Rate  18   09/01/17 13:56


 


Blood Pressure  135/62   09/01/17 13:56


 


O2 Sat by Pulse Oximetry (%)  98   09/01/17 09:00











Constitutional: Yes: Well Nourished, No Distress, Calm


Cardiovascular: Yes: Regular Rate and Rhythm.  No: Gallop, Murmur, Rub


Respiratory: Yes: Regular, CTA Bilaterally.  No: Rales, Rhonchi, Wheezes


Gastrointestinal: Yes: Normal Bowel Sounds, Soft.  No: Distention, Tenderness


Extremities: Yes: WNL


Edema: No


Labs: 


 CBC, BMP





 09/01/17 06:00 





 09/01/17 06:00 





 INR, PTT











INR  1.11  (0.82-1.09)   08/30/17  15:00    














Problem List





- Problems


(1) SBO (small bowel obstruction)


Code(s): K56.69 - OTHER INTESTINAL OBSTRUCTION





(2) Hypertension


Code(s): I10 - ESSENTIAL (PRIMARY) HYPERTENSION   





(3) Hypothyroid


Code(s): E03.9 - HYPOTHYROIDISM, UNSPECIFIED   








Assessment/Plan


(1) SBO (small bowel obstruction)


Assessment/Plan: 


-much improved


-NGT removed 


-tolerating clears


-advance diet per surgery


Code(s): K56.69 - OTHER INTESTINAL OBSTRUCTION





(2) Hypertension


Assessment/Plan: 


-prn IV metoprolol while npo


-restart home regimen when placed on diet


Code(s): I10 - ESSENTIAL (PRIMARY) HYPERTENSION   





(3) Hypothyroid


Assessment/Plan: 


-ok to hold synthroid for short time secondary to long half life


-if long period where needs to be npo, start IV levothyroxine


Code(s): E03.9 - HYPOTHYROIDISM, UNSPECIFIED

## 2017-09-02 LAB
ANION GAP SERPL CALC-SCNC: 7 MMOL/L (ref 8–16)
BASOPHILS # BLD: 0.5 % (ref 0–2)
CALCIUM SERPL-MCNC: 8.3 MG/DL (ref 8.5–10.1)
CO2 SERPL-SCNC: 28 MMOL/L (ref 21–32)
CREAT SERPL-MCNC: 0.3 MG/DL (ref 0.55–1.02)
DEPRECATED RDW RBC AUTO: 12.6 % (ref 11.6–15.6)
EOSINOPHIL # BLD: 1.6 % (ref 0–4.5)
GLUCOSE SERPL-MCNC: 80 MG/DL (ref 74–106)
MAGNESIUM SERPL-MCNC: 1.8 MG/DL (ref 1.8–2.4)
MCH RBC QN AUTO: 30.3 PG (ref 25.7–33.7)
MCHC RBC AUTO-ENTMCNC: 33.6 G/DL (ref 32–36)
MCV RBC: 90.1 FL (ref 80–96)
NEUTROPHILS # BLD: 64.4 % (ref 42.8–82.8)
PHOSPHATE SERPL-MCNC: 2.7 MG/DL (ref 2.5–4.9)
PLATELET # BLD AUTO: 264 K/MM3 (ref 134–434)
PMV BLD: 7.3 FL (ref 7.5–11.1)
WBC # BLD AUTO: 6.6 K/MM3 (ref 4–10)

## 2017-09-02 RX ADMIN — LEVOTHYROXINE SODIUM SCH MCG: 100 TABLET ORAL at 06:07

## 2017-09-02 RX ADMIN — VALSARTAN SCH MG: 80 TABLET, FILM COATED ORAL at 10:00

## 2017-09-02 RX ADMIN — SODIUM CHLORIDE SCH MLS/HR: 9 INJECTION, SOLUTION INTRAVENOUS at 22:41

## 2017-09-02 RX ADMIN — SODIUM CHLORIDE SCH: 9 INJECTION, SOLUTION INTRAVENOUS at 04:04

## 2017-09-02 RX ADMIN — SODIUM CHLORIDE SCH MLS/HR: 9 INJECTION, SOLUTION INTRAVENOUS at 05:53

## 2017-09-02 NOTE — PN
Progress Note, Physician


History of Present Illness: 


Tolerating clears -notes had bowel movement this morning as well





- Current Medication List


Current Medications: 


Active Medications





Hydromorphone HCl (Dilaudid Injection -)  0.5 mg IVPUSH Q6H PRN


   PRN Reason: PAIN


   Last Admin: 08/31/17 06:08 Dose:  0.5 mg


Sodium Chloride (Normal Saline -)  1,000 mls @ 125 mls/hr IV ASDIR [facility]


   Last Admin: 09/02/17 05:53 Dose:  125 mls/hr


Levothyroxine Sodium (Synthroid -)  100 mcg PO ACBK [facility]


   Last Admin: 09/02/17 06:07 Dose:  100 mcg


Ondansetron HCl (Zofran Injection)  4 mg IVPB Q6H PRN


   PRN Reason: NAUSEA AND/OR VOMITING


Valsartan (Diovan -)  80 mg PO DAILY [facility]


   Last Admin: 09/02/17 10:00 Dose:  80 mg











- Objective


Vital Signs: 


 Vital Signs











Temperature  98.2 F   09/02/17 08:16


 


Pulse Rate  64   09/02/17 08:16


 


Respiratory Rate  20   09/02/17 08:16


 


Blood Pressure  131/84   09/02/17 08:16


 


O2 Sat by Pulse Oximetry (%)  100   09/02/17 09:00











Constitutional: Yes: No Distress, Calm


Cardiovascular: Yes: Regular Rate and Rhythm, S1, S2.  No: Murmur


Respiratory: Yes: Regular, CTA Bilaterally.  No: Rales, Rhonchi, Wheezes


Gastrointestinal: Yes: Normal Bowel Sounds, Soft.  No: Distention, Tenderness


Edema: No


Labs: 


 CBC, BMP





 09/02/17 06:05 





 09/02/17 06:05 





 INR, PTT











INR  1.11  (0.82-1.09)   08/30/17  15:00    














Assessment/Plan


Current Active Problems





Hypertension (Acute) 


Hypothyroid (Acute) 


SBO (small bowel obstruction) (Acute) 


h/o gastric sleeve





-surgery following -if continues to tolerate diet then consider d/c home

## 2017-09-03 VITALS — HEART RATE: 53 BPM | SYSTOLIC BLOOD PRESSURE: 143 MMHG | TEMPERATURE: 98.2 F | DIASTOLIC BLOOD PRESSURE: 75 MMHG

## 2017-09-03 RX ADMIN — SODIUM CHLORIDE SCH MLS/HR: 9 INJECTION, SOLUTION INTRAVENOUS at 06:49

## 2017-09-03 RX ADMIN — LEVOTHYROXINE SODIUM SCH MCG: 100 TABLET ORAL at 06:48

## 2017-09-03 RX ADMIN — VALSARTAN SCH MG: 80 TABLET, FILM COATED ORAL at 09:19

## 2017-09-03 NOTE — PN
Progress Note, Physician


History of Present Illness: 


Feels OK, tolerated full fluids yesterday.  Had 2 BM's yesterday.  No abd pain 

today.





- Current Medication List


Current Medications: 


Active Medications





Hydromorphone HCl (Dilaudid Injection -)  0.5 mg IVPUSH Q6H PRN


   PRN Reason: PAIN


   Last Admin: 08/31/17 06:08 Dose:  0.5 mg


Sodium Chloride (Normal Saline -)  1,000 mls @ 125 mls/hr IV ASDIR [facility]


   Last Admin: 09/03/17 06:49 Dose:  125 mls/hr


Levothyroxine Sodium (Synthroid -)  100 mcg PO ACBK [facility]


   Last Admin: 09/03/17 06:48 Dose:  100 mcg


Ondansetron HCl (Zofran Injection)  4 mg IVPB Q6H PRN


   PRN Reason: NAUSEA AND/OR VOMITING


Valsartan (Diovan -)  80 mg PO DAILY [facility]


   Last Admin: 09/02/17 10:00 Dose:  80 mg











- Objective


Vital Signs: 


 Vital Signs











Temperature  97.9 F   09/03/17 06:00


 


Pulse Rate  51 L  09/03/17 06:00


 


Respiratory Rate  18   09/03/17 06:00


 


Blood Pressure  128/74   09/03/17 06:00


 


O2 Sat by Pulse Oximetry (%)  100   09/02/17 21:00











Constitutional: Yes: No Distress, Calm


Cardiovascular: Yes: Regular Rate and Rhythm, S1, S2.  No: Murmur


Respiratory: Yes: Regular, CTA Bilaterally.  No: Rales, Rhonchi, Wheezes


Gastrointestinal: Yes: Normal Bowel Sounds, Soft.  No: Distention, Tenderness


Edema: No


Neurological: Yes: Alert, Oriented


Labs: 


 CBC, BMP





 09/02/17 06:05 





 09/02/17 06:05 





 INR, PTT











INR  1.11  (0.82-1.09)   08/30/17  15:00    














Assessment/Plan


Current Active Problems





Hypertension (Acute) 


Hypothyroid (Acute) 


SBO (small bowel obstruction) (Acute) 


h/o gastric sleeve





-advance diet today and d/c home if tolerates

## 2020-09-06 ENCOUNTER — HOSPITAL ENCOUNTER (INPATIENT)
Dept: HOSPITAL 74 - JER | Age: 65
LOS: 4 days | Discharge: HOME | DRG: 247 | End: 2020-09-10
Attending: GENERAL ACUTE CARE HOSPITAL | Admitting: HOSPITALIST
Payer: COMMERCIAL

## 2020-09-06 VITALS — BODY MASS INDEX: 29.4 KG/M2

## 2020-09-06 DIAGNOSIS — D72.829: ICD-10-CM

## 2020-09-06 DIAGNOSIS — K21.9: ICD-10-CM

## 2020-09-06 DIAGNOSIS — Z96.653: ICD-10-CM

## 2020-09-06 DIAGNOSIS — Q21.1: ICD-10-CM

## 2020-09-06 DIAGNOSIS — K56.50: Primary | ICD-10-CM

## 2020-09-06 DIAGNOSIS — E11.9: ICD-10-CM

## 2020-09-06 DIAGNOSIS — Z98.84: ICD-10-CM

## 2020-09-06 DIAGNOSIS — R01.1: ICD-10-CM

## 2020-09-06 DIAGNOSIS — I10: ICD-10-CM

## 2020-09-06 DIAGNOSIS — E55.9: ICD-10-CM

## 2020-09-06 DIAGNOSIS — E03.9: ICD-10-CM

## 2020-09-06 LAB
ALBUMIN SERPL-MCNC: 4.4 G/DL (ref 3.4–5)
ALP SERPL-CCNC: 94 U/L (ref 45–117)
ALT SERPL-CCNC: 32 U/L (ref 13–61)
ANION GAP SERPL CALC-SCNC: 10 MMOL/L (ref 8–16)
ANISOCYTOSIS BLD QL: 0
AST SERPL-CCNC: 22 U/L (ref 15–37)
BASOPHILS # BLD: 0.1 % (ref 0–2)
BILIRUB SERPL-MCNC: 0.5 MG/DL (ref 0.2–1)
BUN SERPL-MCNC: 19.6 MG/DL (ref 7–18)
CALCIUM SERPL-MCNC: 10.4 MG/DL (ref 8.5–10.1)
CHLORIDE SERPL-SCNC: 100 MMOL/L (ref 98–107)
CO2 SERPL-SCNC: 26 MMOL/L (ref 21–32)
CREAT SERPL-MCNC: 0.7 MG/DL (ref 0.55–1.3)
DEPRECATED RDW RBC AUTO: 13.1 % (ref 11.6–15.6)
EOSINOPHIL # BLD: 0 % (ref 0–4.5)
GLUCOSE SERPL-MCNC: 190 MG/DL (ref 74–106)
HCT VFR BLD CALC: 39.4 % (ref 32.4–45.2)
HGB BLD-MCNC: 13.1 GM/DL (ref 10.7–15.3)
LIPASE SERPL-CCNC: 93 U/L (ref 73–393)
LYMPHOCYTES # BLD: 5.5 % (ref 8–40)
MACROCYTES BLD QL: 0
MCH RBC QN AUTO: 30.1 PG (ref 25.7–33.7)
MCHC RBC AUTO-ENTMCNC: 33.2 G/DL (ref 32–36)
MCV RBC: 90.7 FL (ref 80–96)
MONOCYTES # BLD AUTO: 2.1 % (ref 3.8–10.2)
NEUTROPHILS # BLD: 92.3 % (ref 42.8–82.8)
PLATELET # BLD AUTO: 391 K/MM3 (ref 134–434)
PLATELET BLD QL SMEAR: NORMAL
PMV BLD: 7.3 FL (ref 7.5–11.1)
POTASSIUM SERPLBLD-SCNC: 4.3 MMOL/L (ref 3.5–5.1)
PROT SERPL-MCNC: 8.9 G/DL (ref 6.4–8.2)
RBC # BLD AUTO: 4.35 M/MM3 (ref 3.6–5.2)
SODIUM SERPL-SCNC: 136 MMOL/L (ref 136–145)
WBC # BLD AUTO: 15.3 K/MM3 (ref 4–10)

## 2020-09-06 PROCEDURE — 0D9670Z DRAINAGE OF STOMACH WITH DRAINAGE DEVICE, VIA NATURAL OR ARTIFICIAL OPENING: ICD-10-PCS | Performed by: HOSPITALIST

## 2020-09-06 PROCEDURE — U0003 INFECTIOUS AGENT DETECTION BY NUCLEIC ACID (DNA OR RNA); SEVERE ACUTE RESPIRATORY SYNDROME CORONAVIRUS 2 (SARS-COV-2) (CORONAVIRUS DISEASE [COVID-19]), AMPLIFIED PROBE TECHNIQUE, MAKING USE OF HIGH THROUGHPUT TECHNOLOGIES AS DESCRIBED BY CMS-2020-01-R: HCPCS

## 2020-09-06 NOTE — PN
Teaching Attending Note


Name of Resident: Nuris López





ATTENDING PHYSICIAN STATEMENT





I saw and evaluated the patient.


I reviewed the resident's note and discussed the case with the resident.


I agree with the resident's findings and plan as documented.








SUBJECTIVE:


65yoF with history of GERD, HTN, hypothyroid, T2DM, multiple intraabdominal 

surgeries including hernia repair, DOMO, cholecystectomy, appendectomy, and 

gastric sleeve, and multiple episodes of SBO who presents with abdominal pain 

and cramping. Notes developing abdominal pain and distention after dinner last 

night. Today she has had worsening abdominal pain in addition to bilious emesis,

which has continued since arriving to the ED. Endorses chills. Has not passed 

gas since this morning and last bowel movement was yesterday.





Patient was afebrile and hemodynamically stable in the ED. Work up notable for 

WBC 15.3, CT abd/pelvis on preliminary read showing evidence of SBO. NG tube was

placed in the ED. Patient received 1L NS, Dilaudid 1mg, and Zofran with 

improvement in pain.





ROS: 


(+) abd pain, nausea, vomiting, chills


(-) hematemesis, SOB, chest pain, syncope





OBJECTIVE:


                               Vital Signs - 24 hr











  09/06/20





  14:16


 


Temperature 97.9 F


 


Pulse Rate 86


 


Respiratory 17





Rate 


 


Blood Pressure 128/68


 


O2 Sat by Pulse 99





Oximetry (%) 








EXAM


Gen: Awake, alert, uncomfortable appearing but in no acute distress


HEENT: NGT in place. NC/AT


CV: RRR, 2/6 systolic  murmur best heard RUSB


Resp: CTAB


Abd: Soft, mildly distended, mild tenderness to palpation right lower quadrant. 

Bowel sounds active throughout


Ext: No edema


Neuro: CN II-XII grossly intact


Psych: AOx3





                         Laboratory Results - last 24 hr











  09/06/20 09/06/20





  15:35 15:35


 


WBC  15.3 H 


 


RBC  4.35 


 


Hgb  13.1 


 


Hct  39.4 


 


MCV  90.7 


 


MCH  30.1 


 


MCHC  33.2 


 


RDW  13.1 


 


Plt Count  391  D 


 


MPV  7.3 L 


 


Absolute Neuts (auto)  14.1 H 


 


Neutrophils %  92.3 H D 


 


Neutrophils % (Manual)  94.0 H 


 


Band Neutrophils %  0.0 


 


Lymphocytes %  5.5 L D 


 


Lymphocytes % (Manual)  3.0 L 


 


Monocytes %  2.1 L 


 


Monocytes % (Manual)  3 L 


 


Eosinophils %  0.0  D 


 


Eosinophils % (Manual)  0.0 


 


Basophils %  0.1 


 


Basophils % (Manual)  0.0 


 


Myelocytes % (Man)  0 


 


Promyelocytes % (Man)  0 


 


Blast Cells % (Manual)  0 


 


Nucleated RBC %  0 


 


Metamyelocytes  0 


 


Hypochromia  0 


 


Platelet Estimate  Normal 


 


Polychromasia  0 


 


Poikilocytosis  0 


 


Anisocytosis  0 


 


Microcytosis  0 


 


Macrocytosis  0 


 


Sodium   136


 


Potassium   4.3


 


Chloride   100


 


Carbon Dioxide   26


 


Anion Gap   10


 


BUN   19.6 H


 


Creatinine   0.7


 


Est GFR (CKD-EPI)AfAm   106.87


 


Est GFR (CKD-EPI)NonAf   92.21


 


Random Glucose   190 H


 


Calcium   10.4 H


 


Total Bilirubin   0.5


 


AST   22


 


ALT   32


 


Alkaline Phosphatase   94


 


Creatine Kinase   118


 


Troponin I   < 0.02


 


Total Protein   8.9 H


 


Albumin   4.4


 


Lipase   93




















Imaging reviewed in chart





ASSESSMENT AND PLAN:


65yoF with history of GERD, HTN, hypothyroid, T2DM, multiple intraabdominal 

surgeries including hernia repair, DOMO, cholecystectomy, appendectomy, and 

gastric sleeve, and multiple episodes of SBO who presents with abdominal pain 

and cramping. 





Recurrent SBO


Two prior episodes, most recently 2017


Numerous intraabdominal surgeries in the past





- NGT to intermittent suction


- NPO


- IVF


- morphine, Zofran PRN


- surgery consult








Chronic, stable:


Hypothyroid: Resume levothyroxine when able to tolerate PO


HTN: currently normotensive. Consider intermittent hydralazine IV if needed 

while NPO


T2DM: ISS


Systolic murmur: known history, reports normal echo 6/2020








DVT ppx: Lovenox subq

## 2020-09-06 NOTE — PDOC
History of Present Illness





- General


History Source: Patient


Exam Limitations: No Limitations





- History of Present Illness


Initial Comments: 





20 15:27


Patient is a 65-year-old female with history of hypertension, hypothyroid 

secondary to total thyroidectomy, vitamin D deficiency, bilateral hip 

replacement, gastric sleeve, hernia repair, DOMO, cholecystectomy, appendectomy, 

C/S x2, BTL here with complaints of abdominal pain since last night.  States she

had dinner about 7 PM and directly after she had abdominal bloating and 

generalized abdominal pain which radiated to the back.  Today patient had 

vomiting which was bilious x3 she rates her pain as 10 out of 10 constant 

pressure.  She had about normal bowel movement yesterday and today had a small 

bowel movement and is passing gas.  Denies fever, chills, dysuria.  Patient 

states she has had similar episodes of this kind of pain in the past x2 and was 

diagnosed with bowel obstructions.





PMD:  


PMHX: as above


PSOCHX:  neg etoh, neg drug, neg cig


ALL:  NKDA








Review of Systems: 


GENERAL/CONSTITUTIONAL: No fever or chills. No weakness. No weight change. 


HEAD, EYES, EARS, NOSE AND THROAT: No change in vision. No ear pain or 

discharge. No sore throat. 


CARDIOVASCULAR: No chest pain or shortness of breath. 


RESPIRATORY: No cough, wheezing, or hemoptysis. 


GASTROINTESTINAL: (+) nausea, vomiting, (+) diarrhea or constipation. No rectal 

bleeding. 


GENITOURINARY: No dysuria, frequency, or change in urination. 


MUSCULOSKELETAL: No joint or muscle swelling or pain. No neck or back pain. 


SKIN AND BREASTS: No rash or easy bruising. 


NEUROLOGIC: No headache, vertigo, loss of consciousness, or loss of sensation. 


PSYCHIATRIC: No depression or anxiety. 


ENDOCRINE: No increased thirst. No abnormal weight change. 


HEMATOLOGIC/LYMPHATIC: No anemia, easy bleeding, or history of blood clots. 


ALLERGIC/IMMUNOLOGIC: No hives or skin allergy. No latex allergy.





GENERAL: [The patient is awake, alert, and fully oriented, in acute distress.]


HEAD: [Normal with no signs of trauma.]


EYES: [Pupils equal, round and reactive to light, extraocular movements intact, 

sclera anicteric, conjunctiva clear.]


ENT: [Ears normal, nares patent, oropharynx clear without exudates.  Moist 

mucous membranes.]


NECK: [Normal range of motion, supple without lymphadenopathy, JVD, or masses.]


LUNGS: [Breath sounds equal, clear to auscultation bilaterally.  No wheezes, and

 no crackles.]


HEART: [Regular rate and rhythm, normal S1 and S2 without murmur, rub.]


ABDOMEN: [Distended, generalized tenderness, normoactive bowel sounds.  (-) 

guarding, no rebound.  No masses.]


EXTREMITIES: [Normal range of motion, no edema.  No clubbing or cyanosis. No 

cords, erythema, or tenderness.]


NEUROLOGICAL: [Cranial nerves II through XII grossly intact.  Normal speech, 

normal gait.]


PSYCH: [Normal mood, normal affect.]


SKIN: [Warm, Dry, normal turgor, no rashes or lesions noted.]





<Florina Bruce - Last Filed: 20 23:59>





<Olivia Nagel - Last Filed: 20 12:21>





- General


Chief Complaint: Pain


Stated Complaint: ABDOMINAL PAIN


Time Seen by Provider: 20 15:11





Past History





- Medical History


COPD: No


CHF: No


Diabetes: Yes


GI Disorders: Yes (gerd)


HTN: Yes


Thyroid Disease: Yes





- Surgical History


Abdominal Surgery: Yes (HERNIA REPAIR)


Appendectomy: Yes


Cholecystectomy: Yes


Orthopedic Surgery: Yes (bilateral hip replacement)





- Reproductive History


Is Patient Pregnant Now?: No





- Psycho-Social/Smoking History


Smoking Status: No


Smoking History: Never smoked


Have you smoked in the past 12 months: No


Number of Cigarettes Smoked Daily: 0


Information on smoking cessation initiated: No





- Substance Abuse Hx (Audit-C & DAST Scrn)


How often the patient has a drink containing alcohol: Never


Score: In Men: 4 or > Positive; In Women: 3 or > Positive: 0


Screen Result (Pos requires Nsg. Audit-10AR): Negative


In the last yr the pt used illegal drug/Rx for NonMed reason: No


Score:  Yes response is considered Positive: 0


Screen Result (Positive result requires Nsg. DAST-10): Negative





<Florina Bruce - Last Filed: 20 23:59>





<Olivia Nagel - Last Filed: 20 12:21>





- Medical History


Allergies/Adverse Reactions: 


                                    Allergies











Allergy/AdvReac Type Severity Reaction Status Date / Time


 


No Known Allergies Allergy   Verified 20 14:19











Home Medications: 


Ambulatory Orders





Levothyroxine [Synthroid -] 125 mcg PO DAILY 17 


Valsartan [Diovan] 40 mg PO DAILY 17 











*Physical Exam





- Vital Signs


                                Last Vital Signs











Temp Pulse Resp BP Pulse Ox


 


 97.9 F   86   17   128/68   99 


 


 20 14:16  20 14:16  20 14:16  20 14:16  20 14:16














<Florina Bruce - Last Filed: 20 23:59>





- Vital Signs


                                Last Vital Signs











Temp Pulse Resp BP Pulse Ox


 


 98.1 F   77   18   143/73   99 


 


 20 04:02  20 07:25  20 07:25  20 07:25  20 07:25














<Olivia Nagel - Last Filed: 20 12:21>





ED Treatment Course





- LABORATORY


CBC & Chemistry Diagram: 


                                 20 15:35





                                 20 15:35





- RADIOLOGY


Radiology Studies Ordered: 














 Category Date Time Status


 


 ABDOMEN & PELVIS CT WITH CONTR [CT] Stat CT Scan  20 15:20 Ordered


 


 ABDOMEN FLAT & UPRIGHT [RAD] Stat Radiology  20 15:20 Ordered


 


 CHEST - PA [RAD] Stat Radiology  20 15:20 Ordered














<Florina Bruce - Last Filed: 20 23:59>





- LABORATORY


CBC & Chemistry Diagram: 


                                 20 06:42





                                 20 06:42





- ADDITIONAL ORDERS


Additional order review: 


                                        











  20





  15:35


 


RBC  4.35


 


MCV  90.7


 


MCHC  33.2


 


RDW  13.1


 


MPV  7.3 L


 


Neutrophils %  92.3 H D


 


Lymphocytes %  5.5 L D


 


Monocytes %  2.1 L


 


Eosinophils %  0.0  D


 


Basophils %  0.1














- Medications


Given in the ED: 


ED Medications














Discontinued Medications














Generic Name Dose Route Start Last Admin





  Trade Name Freq  PRN Reason Stop Dose Admin


 


Hydromorphone HCl  1 mg  20 15:20  20 15:56





  Dilaudid Injection -  IVPUSH  20 15:21  1 mg





  ONCE ONE   Administration


 


Ondansetron HCl  4 mg  20 15:20  20 15:56





  Zofran Injection  IVPUSH  20 15:21  4 mg





  ONCE ONE   Administration


 


Sodium Chloride  1,000 ml  20 15:20  20 15:56





  Normal Saline -  IV  20 15:21  1,000 ml





  ONCE ONE   Administration














<Olivia Nagel - Last Filed: 20 12:21>





Medical Decision Making





- Medical Decision Making





20 15:27


Patient is a 65-year-old female with history of hypertension, hypothyroid 

secondary to total thyroidectomy, vitamin D deficiency, bilateral hip 

replacement, gastric sleeve, hernia repair, DOMO, cholecystectomy, appendectomy, 

C/S x2, BTL here with complaints of abdominal pain since last night.  States she

 had dinner about 7 PM and directly after she had abdominal bloating and 

generalized abdominal pain which radiated to the back.  Today patient had 

vomiting which was bilious x3 she rates her pain as 10 out of 10 constant p

ressure.  She had about normal bowel movement yesterday and today had a small 

bowel movement and is passing gas.  Denies fever, chills, dysuria.  Patient 

states she has had similar episodes of this kind of pain in the past x2 and was 

diagnosed with bowel obstructions.





Patient with symptoms consistent with bowel obstruction.


Labs


Pain meds, antinausea, IV fluids


Obstructive series, CT abdomen pelvis


Reassess


Will most likely be admitted.








20 16:40


X-ray reviewed note dilated loops of bowel with air-fluid levels, no free air 

under the diaphragm as read by GUDELIA Alvarez.


CT scan abdomen pelvis.


Patient feels improved of pain.








20 19:59


Patient Full Name: MASOUD JULES


Patient MRN: [id number]


Accession No: [id number]


Patient : 1955


Reason for Exam: PAIN





Referring Physician:





Patient Name: JORGE A MEREDITH





THIS IS A PRELIMINARY REPORT





DATE OF SERVICE: 2020 18:35:24





IMAGES: 230





EXAM: CT ABDOMEN \T\ CT PELVIS CT WITH CONTR





HISTORY: Pain





COMPARISON: None.





TECHNIQUE: Contiguous axial images were obtained from the lung bases through 

pelvis after the administration 79 mL Visipaque 320 intravenous contrast. Oral 

contrast: None. Coronal and sagittal reformations were provided.


One or more of the following dose reduction techniques were used:


Automated exposure control adjustment of the mA and/or kV according to the 

patient size, use of iterative reconstructive technique.





FINDINGS:


LUNGS: The lung bases are:Clear.








LIVER:Unremarkable








GALLBLADDER AND BILIARY SYSTEM: Gallbladder is surgically absent. There is no 

biliary tract dilatation.








PANCREAS:Unremarkable








SPLEEN:Unremarkable








ADRENAL GLANDS ARE: Unremarkable








 TRACT-: No significant renal lesion. No hydronephrosis or hydroureter. 

Urinary bladder is unremarkable.








STOMACH AND BOWEL: Gastric postoperative changes noted. There is no bowel wall 

thickening. There is dilated small bowel measuring up to 3 cm. Exact transition 

is not well delineated but is suggested in the mid anterior abdomen. The 

appendix is not visualized. No pericecal inflammation.








PERITONEUM/RETROPERITONEUM: There is no free intraperitoneal air. No free or 

focal fluid collection. No pathologically enlarged adenopathy.





VESSELS: The abdominal aorta and its major branching vessels are patent. No 

aortic aneurysm. There is moderate arthrosclerotic change.








PELVIS:


Evaluation is limited due to streak artifact from bilateral hip prosthesis.





No pathologically enlarged pelvic or inguinal adenopathy.








MUSCULOSKELETAL: No aggressive bone lesion. Bilateral hip prosthesis appear 

anatomic. Degenerative changes. There is curvature of the spine convex to the 

right.








IMPRESSION:


1. Small bowel dilatation suggesting obstruction. Transition is not well 

delineated but is suggested in the mid anterior abdomen. No free fluid, free 

air.








One or more of the following dose reduction techniques were used: automated 

exposure control, adjustment of the mA and/or kV according to patient size, use 

of iterative reconstructive technique.





THIS DOCUMENT HAS BEEN ELECTRONICALLY SIGNED





Marisa Steve MD





2020 19:04 EST





M.D. Please call [facility] [phone] ([phone]) with questions.








INTERPRETING RADIOLOGIST:


Marisa Steve MD


Electronically Signed: Sep 6th, 2020 07:06PM EDT








Will admit the patient 


NGT





<Florina Bruce - Last Filed: 20 23:59>





- Medical Decision Making





I reviewed the case with the mid-level practitioner and agree with the mid-level

 practitioner's assessment, diagnosis and disposition.








<Olivia Nagel - Last Filed: 09/07/20 12:21>





Discharge





- Discharge Information


Problems reviewed: Yes





- Admission


Yes





<Florina Bruce - Last Filed: 20 23:59>





<Olivia Nagel - Last Filed: 20 12:21>





- Discharge Information


Clinical Impression/Diagnosis: 


 Small bowel obstruction





Condition: Stable

## 2020-09-06 NOTE — HP
CHIEF COMPLAINT:


my belly started hurting last night 





PCP:


Dr. Cristine Cruz





HISTORY OF PRESENT ILLNESS:


64yo F with PHMx of HTN, vit D deficiency, hypothyroidism, and 10 surgical 

procedures including thyroidectomy, bilateral knee replacement, one , 

bilateral tubal ligation, total abdominal hysterectomy, appendectomy, 

cholecystectomy, hernia repair, and gastric sleeve who presented with abdominal 

pain. The pain started last night after she had some of her traditional soup. 

She took some anti-nausea medication but that did not provide relief. This 

morning the pain persisted and she experienced 3 episodes of non-bloody emesis. 

Her lack of symptomatic improvement prompter her to come to the ED, daughter was

at bedside. Patient said the pain was 10/10 at its worst but has been much 

better since receiving morphine. This happened to her before, once exactly on 

this weekend 3 years ago, and another time ~10-15 years ago. Both times she was 

managed conservatively without requiring surgery. In addition to nausea and 

emesis, patient was also experiencing some chills and diaphoresis at home. She 

denied Headaches, dizziness, CP, SOB, palpitations, dysuria, polyuria, diarrhea,

and constipation. Since everything started she has not been able to tolerate PO 

intake. 





ER course was notable for:


(1) temp afebrile 97.9


(2) leukocytosis 15.4 with neutrophilic predominance


(3) Pelvic/Abdominal CT: 3cm dilated small bowel suggesting obstruction





Recent Travel:


denied 





PAST MEDICAL HISTORY:


as per HPI





PAST SURGICAL HISTORY:


as per HPI





Family History:


father:  of thyroid cancer


mother: HTN, diet of heart attack


sister: NTN


brother: HTN


son: overweight, HTN


other son and daughter are healthy 





Social History:


Smoking: denied


Alcohol: socially - maybe once per month 


Drugs: denied


Home: lives at home with daughter 





Allergies


No Known Allergies Allergy (Verified 20 14:19)


   








HOME MEDICATIONS:


                                Home Medications











 Medication  Instructions  Recorded


 


Levothyroxine [Synthroid -] 100 mcg PO DAILY 17


 


Valsartan [Diovan] 80 mg PO DAILY 17








REVIEW OF SYSTEMS


as per HPI





PHYSICAL EXAMINATION


                               Vital Signs - 24 hr











  20





  14:16


 


Temperature 97.9 F


 


Pulse Rate 86


 


Respiratory 17





Rate 


 


Blood Pressure 128/68


 


O2 Sat by Pulse 99





Oximetry (%) 











GENERAL:  F, appears stated age, comfortably on ED bed in no acute 

distress, AAOx3 with daughter at bedside


HEAD: Normal with no signs of trauma


EYES: Pupils equal, round and reactive to light, extraocular movements intact


EARS, NOSE, THROAT: dry mucous membranes


NECK: No lymphadenopathy noted


LUNGS: CTAB


HEART: Regular rate and rhythm, normal S1 and S2 without murmur


ABDOMEN: Soft, mild discomfort on percussion in all quadrants, not distended, 

hypoactive bowel sounds, no guarding or rebound noted


EXTREMITIES: 2+ dorsalis pedis and radial pulses, warm, well-perfused. No calf 

tenderness. No peripheral edema. 


NEUROLOGICAL:  Cranial nerves II-XII intact. Normal speech with symmetrical 

facial movements 


PSYCHIATRIC: Cooperative and interactive. Good eye contact. Appropriate mood and

 affect.


SKIN: no rashes or lesions noted





                              Abnormal Lab Results











  20





  15:35 15:35 06:42


 


WBC  15.3 H   12.7 H


 


MPV  7.3 L   7.3 L


 


Absolute Neuts (auto)  14.1 H   9.7 H


 


Neutrophils %  92.3 H D  


 


Neutrophils % (Manual)  94.0 H  


 


Lymphocytes %  5.5 L D  


 


Lymphocytes % (Manual)  3.0 L  


 


Monocytes %  2.1 L  


 


Monocytes % (Manual)  3 L  


 


BUN   19.6 H 


 


Random Glucose   190 H 


 


Calcium   10.4 H 


 


Total Protein   8.9 H 











ASSESSMENT/PLAN:


64yo F with PHMx of HTN, vit D deficiency, hypothyroidism, and 10 surgical 

procedures including thyroidectomy, bilateral knee replacement, one , 

bilateral tubal ligation, total abdominal hysterectomy, appendectomy, 

cholecystectomy, hernia repair, and gastric sleeve who presented with abdominal 

pain. Patient was afebrile on admission and initial labs showed 15.3 

leukocytosis with neutrophilic predominance. Pelvic/Abdominal CT showed 3cm 

dilated small bowel suggesting obstruction, and patient was admitted for further

 management of SBO. 





Abdominal pain 2/2 to SBO likely caused by adhesions: patient has extensive 

surgical history, also had SBO twice prior to this admission 


- consulted surgery 


- NG tube placement with f/u CXR ordered 


- PRN morphine and tylenol for pain 


- PRN zofran for nausea 


- NPO with 100cc/h IVF





Hypothyroidism 2/2 to thyroidectomy (family history of thyroid cancer)


- resume home levothyroxine 





HTN


- hold home telmisartan for now - restart when clinically indicated 





PPX


- DVT: lovenox





FEN


- 100cc/h NS


- replete lytes PRN


- NPO 





Dispo: [email or web address]














Family Medical History


Family History: As Documented





Visit type





- Emergency Visit


Emergency Visit: Yes


ED Registration Date: 20


Care time: The patient presented to the Emergency Department on the above date 

and was hospitalized for further evaluation of their emergent condition.





- New Patient


This patient is new to me today: Yes


Date on this admission: 20





- Critical Care


Critical Care patient: No





ATTENDING PHYSICIAN STATEMENT





I saw and evaluated the patient.


I reviewed the resident's note and discussed the case with the resident.


I agree with the resident's findings and plan as documented.








SUBJECTIVE:








OBJECTIVE:








ASSESSMENT AND PLAN:

## 2020-09-07 LAB
ALBUMIN SERPL-MCNC: 3.3 G/DL (ref 3.4–5)
ALP SERPL-CCNC: 74 U/L (ref 45–117)
ALT SERPL-CCNC: 28 U/L (ref 13–61)
ANION GAP SERPL CALC-SCNC: 9 MMOL/L (ref 8–16)
AST SERPL-CCNC: 22 U/L (ref 15–37)
BASOPHILS # BLD: 0.2 % (ref 0–2)
BILIRUB SERPL-MCNC: 0.6 MG/DL (ref 0.2–1)
BUN SERPL-MCNC: 15.2 MG/DL (ref 7–18)
CALCIUM SERPL-MCNC: 8.9 MG/DL (ref 8.5–10.1)
CHLORIDE SERPL-SCNC: 106 MMOL/L (ref 98–107)
CO2 SERPL-SCNC: 25 MMOL/L (ref 21–32)
CREAT SERPL-MCNC: 0.4 MG/DL (ref 0.55–1.3)
DEPRECATED RDW RBC AUTO: 13 % (ref 11.6–15.6)
EOSINOPHIL # BLD: 0.6 % (ref 0–4.5)
GLUCOSE SERPL-MCNC: 102 MG/DL (ref 74–106)
HCT VFR BLD CALC: 35.2 % (ref 32.4–45.2)
HGB BLD-MCNC: 11.6 GM/DL (ref 10.7–15.3)
LYMPHOCYTES # BLD: 16.6 % (ref 8–40)
MAGNESIUM SERPL-MCNC: 2.3 MG/DL (ref 1.8–2.4)
MCH RBC QN AUTO: 30 PG (ref 25.7–33.7)
MCHC RBC AUTO-ENTMCNC: 33 G/DL (ref 32–36)
MCV RBC: 90.9 FL (ref 80–96)
MONOCYTES # BLD AUTO: 5.9 % (ref 3.8–10.2)
NEUTROPHILS # BLD: 76.7 % (ref 42.8–82.8)
PHOSPHATE SERPL-MCNC: 3.1 MG/DL (ref 2.5–4.9)
PLATELET # BLD AUTO: 314 K/MM3 (ref 134–434)
PMV BLD: 7.3 FL (ref 7.5–11.1)
POTASSIUM SERPLBLD-SCNC: 3.9 MMOL/L (ref 3.5–5.1)
PROT SERPL-MCNC: 7 G/DL (ref 6.4–8.2)
RBC # BLD AUTO: 3.87 M/MM3 (ref 3.6–5.2)
SODIUM SERPL-SCNC: 140 MMOL/L (ref 136–145)
WBC # BLD AUTO: 12.7 K/MM3 (ref 4–10)

## 2020-09-07 RX ADMIN — ENOXAPARIN SODIUM SCH MG: 40 INJECTION SUBCUTANEOUS at 09:32

## 2020-09-07 RX ADMIN — ACETAMINOPHEN PRN MG: 10 INJECTION, SOLUTION INTRAVENOUS at 01:55

## 2020-09-07 RX ADMIN — SODIUM CHLORIDE SCH MLS/HR: 9 INJECTION, SOLUTION INTRAVENOUS at 01:54

## 2020-09-07 RX ADMIN — ACETAMINOPHEN PRN MG: 10 INJECTION, SOLUTION INTRAVENOUS at 17:09

## 2020-09-07 NOTE — CONSULT
- Consultation


REQUESTING PROVIDER: Thelma López MD





CONSULT REQUEST: We have been asked to surgically evaluate this patient for a 

small bowel obstruction.





Hospitalist:Shonna Teresa MD





HISTORY OF PRESENT ILLNESS: SHEA who is a 64 y/o  female last seen by me

08/31/2017 with an sbo txed conservatively w/a good response and now presenting 

for evaluation and management of a # recurrent SBO; patient has had # abdominal 

surgeries in the past; she came in b/o crampy abdominal pain; she states she is 

not passing flatus. She has NOC; she had nausea w/o vomiting. She has had other 

sbo's in the past. She has no other GI//GYN c/o.





PMHx:    hypothyroid; hypertension      





PSHx:     open martha; appendectomy; sleeve gastrectomy; DOMO; incisional hernia 

repair; C-S; b/l THR and previous h/o SBO  


                                        


Home Medications











 Medication  Instructions  Recorded


 


Levothyroxine [Synthroid -] 125 mcg PO DAILY 08/30/17


 


Valsartan [Diovan] 40 mg PO DAILY 08/30/17








                                    Allergies











Allergy/AdvReac Type Severity Reaction Status Date / Time


 


No Known Allergies Allergy   Verified 09/06/20 14:19








REVIEW OF SYSTEMS:


CONSTITUTIONAL: 


Absent:  fever, chills, diaphoresis, generalized weakness, malaise, loss of 

appetite, weight change


CARDIOVASCULAR: 


Absent: chest pain, syncope, palpitations, irregular heart rate, 

lightheadedness, peripheral edema


RESPIRATORY: 


Absent: cough, shortness of breath, dyspnea with exertion, wheezing, stridor, 

hemoptysis


GASTROINTESTINAL:


Present: abdominal pain, abdominal distension, nausea, vomiting, Absent: 

diarrhea, constipation, melena, hematochezia


GENITOURINARY: 


Absent: dysuria, frequency, urgency, hesitancy, hematuria, flank pain, genital 

pain


MUSCULOSKELETAL: 


Absent: myalgia, arthralgia, joint swelling, back pain, neck pain


SKIN: 


Absent: rash, itching, pallor


HEMATOLOGIC/IMMUNOLOGIC: 


Absent: easy bleeding, easy bruising, lymphadenopathy


NEUROLOGIC: 


Absent: headache, focal weakness, paresthesias, dizziness, unsteady gait, 

seizure, mental status changes, 


bladder or bowel incontinence


PSYCHIATRIC: 


Absent: anxiety, depression, suicidal or homicidal ideation, hallucinations.





PHYSICAL EXAM:


GENERAL: Awake, alert, and fully oriented, in no acute distress.


HEAD: Normal with no signs of trauma.


EYES: sclera anicteric, conjunctiva clear.


NECK: Normal ROM, supple without lymphadenopathy, JVD, or masses.


ABDOMEN: Soft, nontender, minimally distended and tympanitic, hypoactive bowel 

sounds, no guarding, no rebound, no masses.  No organomegaly. # scars; no he

rnias.


MUSCULOSKELETAL: Normal ROM at all joints. No bony deformities or tenderness. No

 CVA tenderness.


UPPER EXTREMITIES: 2+ pulses, warm, well-perfused. No cyanosis. Cap refill <2 

seconds. No peripheral edema.


LOWER EXTREMITIES: 2+ pulses, warm, well-perfused. No calf tenderness. No 

peripheral edema. 


NEUROLOGICAL: Normal speech, gait not observed.


PSYCH: Cooperative. Good eye contact. Appropriate mood and affect.


SKIN: Warm, dry, normal turgor, no rashes or lesions noted.


                                   Vital Signs











Temperature  98.1 F   09/07/20 04:02


 


Pulse Rate  77   09/07/20 07:25


 


Respiratory Rate  18   09/07/20 07:25


 


Blood Pressure  143/73   09/07/20 07:25


 


O2 Sat by Pulse Oximetry (%)  99   09/07/20 07:25








                                   Lab Results











WBC  12.7 K/mm3 (4.0-10.0)  H  09/07/20  06:42    


 


RBC  3.87 M/mm3 (3.60-5.2)   09/07/20  06:42    


 


Hgb  11.6 GM/dL (10.7-15.3)   09/07/20  06:42    


 


Hct  35.2 % (32.4-45.2)   09/07/20  06:42    


 


MCV  90.9 fl (80-96)   09/07/20  06:42    


 


MCHC  33.0 g/dl (32.0-36.0)   09/07/20  06:42    


 


RDW  13.0 % (11.6-15.6)   09/07/20  06:42    


 


Plt Count  314 K/MM3 (134-434)   09/07/20  06:42    


 


Sodium  140 mmol/L (136-145)   09/07/20  06:42    


 


Potassium  3.9 mmol/L (3.5-5.1)   09/07/20  06:42    


 


Chloride  106 mmol/L ()   09/07/20  06:42    


 


Carbon Dioxide  25 mmol/L (21-32)   09/07/20  06:42    


 


Anion Gap  9 MMOL/L (8-16)   09/07/20  06:42    


 


BUN  15.2 mg/dL (7-18)   09/07/20  06:42    


 


Creatinine  0.4 mg/dL (0.55-1.3)  L  09/07/20  06:42    


 


Random Glucose  102 mg/dL ()   09/07/20  06:42    


 


Calcium  8.9 mg/dL (8.5-10.1)   09/07/20  06:42    








Imaging w/u to date reviewed including plain films and CT scan of the a/p








IMP: # recurrent SBO





PLAN: NPO/IVF/NGT/serial exams and abdominal xrays and possible operatiive 

intervention if failed conservative tx.; a/a/u by the patient.








Ricky Nathan MD FACS

## 2020-09-07 NOTE — EKG
Test Reason : 

Blood Pressure : ***/*** mmHG

Vent. Rate : 070 BPM     Atrial Rate : 070 BPM

   P-R Int : 196 ms          QRS Dur : 098 ms

    QT Int : 402 ms       P-R-T Axes : 001 -05 017 degrees

   QTc Int : 434 ms

 

NORMAL SINUS RHYTHM

SEPTAL INFARCT (CITED ON OR BEFORE 30-AUG-2017)

ABNORMAL ECG

WHEN COMPARED WITH ECG OF 30-AUG-2017 15:14,

NO SIGNIFICANT CHANGE WAS FOUND

Confirmed by TOMMY DUPREE, VANCE ([id number]) on 9/7/2020 9:23:46 PM

 

Referred By:             Confirmed By:VANCE SANDERS MD

## 2020-09-08 LAB
ALBUMIN SERPL-MCNC: 3.4 G/DL (ref 3.4–5)
ALP SERPL-CCNC: 75 U/L (ref 45–117)
ALT SERPL-CCNC: 25 U/L (ref 13–61)
ANION GAP SERPL CALC-SCNC: 9 MMOL/L (ref 8–16)
AST SERPL-CCNC: 20 U/L (ref 15–37)
BILIRUB SERPL-MCNC: 0.6 MG/DL (ref 0.2–1)
BUN SERPL-MCNC: 10.2 MG/DL (ref 7–18)
CALCIUM SERPL-MCNC: 8 MG/DL (ref 8.5–10.1)
CHLORIDE SERPL-SCNC: 109 MMOL/L (ref 98–107)
CO2 SERPL-SCNC: 25 MMOL/L (ref 21–32)
CREAT SERPL-MCNC: 0.5 MG/DL (ref 0.55–1.3)
DEPRECATED RDW RBC AUTO: 13.1 % (ref 11.6–15.6)
GLUCOSE SERPL-MCNC: 84 MG/DL (ref 74–106)
HCT VFR BLD CALC: 35.9 % (ref 32.4–45.2)
HGB BLD-MCNC: 11.9 GM/DL (ref 10.7–15.3)
MAGNESIUM SERPL-MCNC: 1.9 MG/DL (ref 1.8–2.4)
MCH RBC QN AUTO: 30.3 PG (ref 25.7–33.7)
MCHC RBC AUTO-ENTMCNC: 33.1 G/DL (ref 32–36)
MCV RBC: 91.7 FL (ref 80–96)
PLATELET # BLD AUTO: 333 K/MM3 (ref 134–434)
PMV BLD: 7.5 FL (ref 7.5–11.1)
POTASSIUM SERPLBLD-SCNC: 3.9 MMOL/L (ref 3.5–5.1)
PROT SERPL-MCNC: 7 G/DL (ref 6.4–8.2)
RBC # BLD AUTO: 3.92 M/MM3 (ref 3.6–5.2)
SODIUM SERPL-SCNC: 142 MMOL/L (ref 136–145)
WBC # BLD AUTO: 12.9 K/MM3 (ref 4–10)

## 2020-09-08 RX ADMIN — SODIUM CHLORIDE SCH MLS/HR: 9 INJECTION, SOLUTION INTRAVENOUS at 17:46

## 2020-09-08 RX ADMIN — LEVOTHYROXINE SODIUM ANHYDROUS SCH MCG: 500 INJECTION, POWDER, LYOPHILIZED, FOR SOLUTION INTRAVENOUS at 13:50

## 2020-09-08 RX ADMIN — ENOXAPARIN SODIUM SCH MG: 40 INJECTION SUBCUTANEOUS at 11:16

## 2020-09-08 RX ADMIN — SODIUM CHLORIDE SCH MLS/HR: 9 INJECTION, SOLUTION INTRAVENOUS at 00:21

## 2020-09-08 NOTE — PN
Progress Note (short form)





- Note


Progress Note: 





GENERAL SURGERY





Patient is well known to the Surgery Service as Dr. Nathan recently cared for 

her with her most recent admission (same problem, SBO which was managed 

conservatively). Admitted with recurrent SBO.  PSHx of multiple ABD surgeries.  

Passed flatus while in ED.





Currently, supine in bed. C/o mild LLQ ttp. Had some nausea last night but 

quelled with antiemetic.  NGT remains in with very little in the canister.


Denies n/v/f/c, CP, palpitations, SOB or LOTT. States she continues to pass 

flatus but no BM since admission.





                                Last Vital Signs











Temp Pulse Resp BP Pulse Ox


 


 99.2 F   63   18   156/70   99 


 


 09/08/20 05:00  09/08/20 05:00  09/08/20 05:00  09/08/20 05:00  09/08/20 05:00








                                    CBC, BMP





                                 09/08/20 06:40 





                                 09/08/20 06:40 





GEN: alert. nad


PULM: unlabored respirations on room air


COR: rrr


ABD: soft. slight llq ttp. bowel sounds present.  no rebound/rigidity/guarding





A/P: 66 yo female admitted with recurrent SBO (h/o multiple abd surgeries). 

Managed conservatively in the past. 





-f/u AXR (flat & upright)


-NGT to bedside bag and check residual in 4 hours (possible dc today)


-replete elytes prn


-If fails to progress may need diagnostic laparoscopy vs. exlap


-Medical optimization


Above plan discussed with Dr. Nathan and agrees.





<Angelo Ponce P - Last Filed: 09/08/20 10:26>





- Note


Progress Note: 





Attending Surgeon





Attending Surgeon:


I personally saw and examined the patient. My examination reveals a patient with

a SBO. I discussed the case with the surgical PA and agree with their findings 

and plan of care with any exceptions as noted. ~ Ricky Nathan MD, FACS 





Passing flatus; AXR improved





NGT removed; keep NPO.





Ricky Nathan MD FACS





<Ricky Nathan N - Last Filed: 09/08/20 17:21>

## 2020-09-08 NOTE — PN
Teaching Attending Note


Name of Resident: Torri Macias





ATTENDING PHYSICIAN STATEMENT





I saw and evaluated the patient.


I reviewed the resident's note and discussed the case with the resident.


I agree with the resident's findings and plan as documented.








SUBJECTIVE: Generally improving abdominal discomfort. NGT replaced this AM.  








OBJECTIVE: Tmax 99.5, Hemodynamically stable. NGT in situ.


                                Last Vital Signs











Temp Pulse Resp BP Pulse Ox


 


 98.2 F   76   18   158/77   98 


 


 09/08/20 14:48  09/08/20 14:48  09/08/20 14:48  09/08/20 14:48  09/08/20 14:48








HEENT - Atraumatic, Normocephalic


Heart - S1, S2, RRR


Lungs - clear to auscultation


Abdomen - Soft, mild generalized tenderness. Bowel Sounds normal.


Extremities - no edema, no calf tenderness. 


Neuro - AAO x 3. Tone/Power normal.





                         Laboratory Results - last 24 hr











  09/06/20 09/08/20 09/08/20





  20:55 06:40 06:40


 


WBC   12.9 H 


 


RBC   3.92 


 


Hgb   11.9 


 


Hct   35.9 


 


MCV   91.7 


 


MCH   30.3 


 


MCHC   33.1 


 


RDW   13.1 


 


Plt Count   333 


 


MPV   7.5 


 


Sodium    142


 


Potassium    3.9


 


Chloride    109 H


 


Carbon Dioxide    25


 


Anion Gap    9


 


BUN    10.2


 


Creatinine    0.5 L


 


Est GFR (CKD-EPI)AfAm    119.38


 


Est GFR (CKD-EPI)NonAf    103.00


 


Random Glucose    84


 


Calcium    8.0 L


 


Magnesium    1.9


 


Total Bilirubin    0.6


 


AST    20


 


ALT    25


 


Alkaline Phosphatase    75


 


Total Protein    7.0


 


Albumin    3.4


 


TSH    0.81


 


COVID-19 ([facility])  Not detected  








                               Current Medications











Generic Name Dose Route Start Last Admin





  Trade Name Freq  PRN Reason Stop Dose Admin


 


Acetaminophen  1,000 mg  09/08/20 17:22  09/08/20 17:46





  Ofirmev Injection -  IVPB  09/09/20 17:22  1,000 mg





  Q6H PRN   Administration





  HEADACHE  


 


Enoxaparin Sodium  40 mg  09/07/20 10:00  09/08/20 11:16





  Lovenox -  SQ   40 mg





  DAILY CATHIE   Administration


 


Sodium Chloride  1,000 mls @ 100 mls/hr  09/08/20 15:00  09/08/20 17:46





  Normal Saline -  IV   100 mls/hr





  ASDIR CATHIE   Administration


 


Levothyroxine Sodium  75 mcg  09/08/20 10:00  09/08/20 13:50





  Synthroid Injection -  IVPUSH   75 mcg





  DAILY CATHIE   Administration


 


Morphine Sulfate  4 mg  09/06/20 22:49 





  Morphine Sulfate  IVPUSH  





  Q4H PRN  





  PAIN LEVEL 7 - 10  


 


Ondansetron HCl  4 mg  09/06/20 22:50  09/07/20 20:22





  Zofran Injection  IVPUSH   4 mg





  Q4H PRN   Administration





  NAUSEA AND/OR VOMITING  








                                Home Medications











 Medication  Instructions  Recorded


 


Levothyroxine [Synthroid -] 125 mcg PO DAILY 08/30/17


 


Valsartan [Diovan] 40 mg PO DAILY 08/30/17


 


Ergocalciferol (Vitamin D2) 50,000 unit PO WEEKLY 09/07/20





[Vitamin D2]  

















ASSESSMENT AND PLAN:





65 year old female with history of GERD, HTN, Hypothyroidism, DM 2, multiple 

intra-abdominal surgeries (s/p hernia repair, s/p DOMO, s/p cholecystectomy, s/p 

appendectomy, s/p gastric sleeve), patent PFO, history of recurrent SBO, 

presents with abdominal pain and cramping. 





1. Acute SBO - recurrent


CT A/P - SBO


Trial of conservative therapy - NGT decompression, NPO, IV fluids.


Surgery following.





2. Hypothyroidism - continue Levothyroxine.





3. HTN - Valsartan held while patient NPO with NGT..





4. DM 2 - A1c wnl, appears to be diet controlled. 





5. patent PFO with L to R flow on recent echo 7/20 - no further Ix as per Cardio

- for out-patient follow up. 





DVT Px - Lovenox SQ

## 2020-09-08 NOTE — CON.CARD
Consult


Consult Specialty:: Cardiology


Referred by:: Medicine


Reason for Consultation:: murmur





- History of Present Illness


Chief Complaint: abd pain


History of Present Illness: 


65F h/o HTN, hypothyroidism p/w abdominal pain starting night prior to 

admission.  no chest pain, palps, dizziness, dyspnea.  sees Dr. Miguel for 

cardio. Imaging here shows SBO.











- Past Medical History


...Pregnant: No


Endocrine: Yes: Hypothyroidism





- Alcohol/Substance Use


Hx Alcohol Use: Yes (SOCIAL)





- Smoking History


Smoking history: Never smoked


Have you smoked in the past 12 months: No


Aproximately how many cigarettes per day: 0





Home Medications





- Allergies


Allergies/Adverse Reactions: 


                                    Allergies











Allergy/AdvReac Type Severity Reaction Status Date / Time


 


No Known Allergies Allergy   Verified 09/06/20 14:19














- Home Medications


Home Medications: 


Ambulatory Orders





Levothyroxine [Synthroid -] 125 mcg PO DAILY 08/30/17 


Valsartan [Diovan] 40 mg PO DAILY 08/30/17 


Ergocalciferol (Vitamin D2) [Vitamin D2] 50,000 unit PO WEEKLY 09/07/20 











Family Medical History


Family History: Unremarkable





Review of Systems





- Review of Systems


Constitutional: reports: No Symptoms


Eyes: reports: No Symptoms


HENT: reports: No Symptoms


Neck: reports: No Symptoms


Cardiovascular: reports: No Symptoms


Gastrointestinal: reports: Abdominal Pain


Genitourinary: reports: No Symptoms


Musculoskeletal: reports: No Symptoms


Integumentary: reports: No Symptoms


Neurological: reports: No Symptoms


Endocrine: reports: No Symptoms


Hematology/Lymphatic: reports: No Symptoms


Psychiatric: reports: No Symptoms


Vital Signs: 


                                   Vital Signs











Temperature  99.3 F   09/08/20 11:28


 


Pulse Rate  72   09/08/20 11:28


 


Respiratory Rate  18   09/08/20 11:28


 


Blood Pressure  150/75   09/08/20 11:28


 


O2 Sat by Pulse Oximetry (%)  96   09/08/20 11:28











Constitutional: Yes: Well Nourished, No Distress, Calm


Eyes: Yes: Conjunctiva Clear, EOM Intact


HENT: Yes: Atraumatic, Normocephalic


Neck: Yes: Supple, Trachea Midline


Respiratory: Yes: Regular, CTA Bilaterally


Gastrointestinal: Yes: Soft.  No: Tenderness


Cardiovascular: Yes: Regular Rate and Rhythm


JVD: No


Heart Sounds: Yes: S1, S2


Murmur: Yes: Systolic Murmur (II/VI RUSB)


Musculoskeletal: No: Back Pain


Extremities: No: Cold


Edema: No


Integumentary: No: Jaundice


Neurological: Yes: Alert, Oriented


Psychiatric: No: Agitated





- Other Data


Labs, Other Data: 


                                    CBC, BMP





                                 09/08/20 06:40 





                                 09/08/20 06:40 











Assessment/Plan


echo 7/2020 nl LV/RV function, small PFO with L to R flow, tr TR, tr TX





EKG: sinus, nl intervals, no ischemic changes





preop, abd pain, SBO


- manage per surgery


- recent echo unremarkable


- she is at acceptable risk for abdominal surgery if needed, no further testing 

prior to procedure





murmur


- recent office echo unremarkable, no further testing here





hypothyroidism


- manage per primary





HTN


- holding home meds - NPO with NGT to suction


- monitor BP for now No

## 2020-09-08 NOTE — PN
Physical Exam: 


SUBJECTIVE:


Patient seen and examined. States abdominal pain much decrease from admission. 

Denies any fever/chills, SOB, N/V. States she is passing gas. NG tube fell out 

overnight, replaced and XR shows in place.








OBJECTIVE:





                                   Vital Signs











 Period  Temp  Pulse  Resp  BP Sys/Anglin  Pulse Ox


 


 Last 24 Hr  99 F-99.5 F  63-73  17-18  140-156/64-75  96-99











GENERAL: The patient is awake, alert, and fully oriented, in no acute distress.


HEAD: Normal with no signs of trauma.


EYES: PERRL, extraocular movements intact, sclera anicteric, conjunctiva clear. 

No ptosis. 


ENT: Ears normal, nares patent, oropharynx clear without exudates, moist mucous 

membranes.


NECK: Trachea midline, full range of motion, supple. 


LUNGS: Breath sounds equal, clear to auscultation bilaterally, no wheezes, no 

crackles, no accessory muscle use. 


HEART: Regular rate and rhythm, S1, S2 without murmur, rub or gallop.


ABDOMEN: Soft, tender to palpation, hypoactive bowel sounds, nondistended, 

normoactive bowel sounds, no guarding, no rebound, no hepatosplenomegaly, no 

masses.


EXTREMITIES: 2+ pulses, warm, well-perfused, no edema. 


NEUROLOGICAL: Cranial nerves II through XII grossly intact. Normal speech, gait 

not observed.


PSYCH: Normal mood, normal affect.


SKIN: Warm, dry, normal turgor, no rashes or lesions noted














                         Laboratory Results - last 24 hr











  09/06/20 09/08/20 09/08/20





  20:55 06:40 06:40


 


WBC   12.9 H 


 


RBC   3.92 


 


Hgb   11.9 


 


Hct   35.9 


 


MCV   91.7 


 


MCH   30.3 


 


MCHC   33.1 


 


RDW   13.1 


 


Plt Count   333 


 


MPV   7.5 


 


Sodium    142


 


Potassium    3.9


 


Chloride    109 H


 


Carbon Dioxide    25


 


Anion Gap    9


 


BUN    10.2


 


Creatinine    0.5 L


 


Est GFR (CKD-EPI)AfAm    119.38


 


Est GFR (CKD-EPI)NonAf    103.00


 


Random Glucose    84


 


Calcium    8.0 L


 


Magnesium    1.9


 


Total Bilirubin    0.6


 


AST    20


 


ALT    25


 


Alkaline Phosphatase    75


 


Total Protein    7.0


 


Albumin    3.4


 


TSH    0.81


 


COVID-19 ([facility])  Not detected  








Active Medications











Generic Name Dose Route Start Last Admin





  Trade Name Freq  PRN Reason Stop Dose Admin


 


Enoxaparin Sodium  40 mg  09/07/20 10:00  09/08/20 11:16





  Lovenox -  SQ   40 mg





  DAILY CATHIE   Administration


 


Levothyroxine Sodium  75 mcg  09/08/20 10:00  09/08/20 13:50





  Synthroid Injection -  IVPUSH   75 mcg





  DAILY CATHIE   Administration


 


Morphine Sulfate  4 mg  09/06/20 22:49 





  Morphine Sulfate  IVPUSH  





  Q4H PRN  





  PAIN LEVEL 7 - 10  


 


Ondansetron HCl  4 mg  09/06/20 22:50  09/07/20 20:22





  Zofran Injection  IVPUSH   4 mg





  Q4H PRN   Administration





  NAUSEA AND/OR VOMITING  











ASSESSMENT/PLAN:


Pt is a 65 year old female with PMHx of GERD, HTN, hypothyroid, DM2, multiple 

intraabdominal surgeries (hernia repair, DOMO, cholecystectomy, appendectomy, and

gastric sleeve), and episodes of SBO in the past, presenting with abdominal 

pain.





#Recurrent SBO


-multiple episodes of SBO in the past


-NPO, BGT, IVF, bowel rest 


-Morphine 4mg prn for pain


-Surgery consulted --> continue management, serial abdominal x-rays, possible 

intervention if no improvement in sx


-Repeat abdominal x-ray shows partial improvement - still some air distended 

loops of small bowels; air and stool seen in colon





#Hx of hypothyroid


-On IV synthroid due to NPO status





#Hx of HTN


-Held home telmisartan for now


-monitor BP





#Hx of DM2


-A1c ordered


-not on meds at home





PPx


Lovenox





FEN


NPO


Monitor electrolytes


NS 100cc/hr





Dispo:


Admitted to med surg. IVF, NGtube and NPO, bowel rest. Surgical intervention if 

sx don't improve. Abdominal x-ray showing partial improvement, air and stool 

seen in colon.





Visit type





- Emergency Visit


Emergency Visit: Yes


ED Registration Date: 09/06/20


Care time: The patient presented to the Emergency Department on the above date 

and was hospitalized for further evaluation of their emergent condition.





- New Patient


This patient is new to me today: No





- Critical Care


Critical Care patient: No





ATTENDING PHYSICIAN STATEMENT





I saw and evaluated the patient.


I reviewed the resident's note and discussed the case with the resident.


I agree with the resident's findings and plan as documented.








SUBJECTIVE:








OBJECTIVE:








ASSESSMENT AND PLAN:

## 2020-09-09 LAB
ALBUMIN SERPL-MCNC: 2.8 G/DL (ref 3.4–5)
ALP SERPL-CCNC: 70 U/L (ref 45–117)
ALT SERPL-CCNC: 22 U/L (ref 13–61)
ANION GAP SERPL CALC-SCNC: 11 MMOL/L (ref 8–16)
AST SERPL-CCNC: 18 U/L (ref 15–37)
BILIRUB SERPL-MCNC: 0.7 MG/DL (ref 0.2–1)
BUN SERPL-MCNC: 9.6 MG/DL (ref 7–18)
CALCIUM SERPL-MCNC: 8.3 MG/DL (ref 8.5–10.1)
CHLORIDE SERPL-SCNC: 110 MMOL/L (ref 98–107)
CO2 SERPL-SCNC: 23 MMOL/L (ref 21–32)
CREAT SERPL-MCNC: 0.3 MG/DL (ref 0.55–1.3)
DEPRECATED RDW RBC AUTO: 13 % (ref 11.6–15.6)
GLUCOSE SERPL-MCNC: 55 MG/DL (ref 74–106)
HCT VFR BLD CALC: 31.3 % (ref 32.4–45.2)
HGB BLD-MCNC: 10.4 GM/DL (ref 10.7–15.3)
MAGNESIUM SERPL-MCNC: 1.9 MG/DL (ref 1.8–2.4)
MCH RBC QN AUTO: 30.8 PG (ref 25.7–33.7)
MCHC RBC AUTO-ENTMCNC: 33.3 G/DL (ref 32–36)
MCV RBC: 92.4 FL (ref 80–96)
PHOSPHATE SERPL-MCNC: 2.5 MG/DL (ref 2.5–4.9)
PLATELET # BLD AUTO: 270 K/MM3 (ref 134–434)
PMV BLD: 7.7 FL (ref 7.5–11.1)
POTASSIUM SERPLBLD-SCNC: 3.5 MMOL/L (ref 3.5–5.1)
PROT SERPL-MCNC: 6.1 G/DL (ref 6.4–8.2)
RBC # BLD AUTO: 3.39 M/MM3 (ref 3.6–5.2)
SODIUM SERPL-SCNC: 144 MMOL/L (ref 136–145)
WBC # BLD AUTO: 8.8 K/MM3 (ref 4–10)

## 2020-09-09 RX ADMIN — LEVOTHYROXINE SODIUM ANHYDROUS SCH MCG: 500 INJECTION, POWDER, LYOPHILIZED, FOR SOLUTION INTRAVENOUS at 09:49

## 2020-09-09 RX ADMIN — SODIUM CHLORIDE SCH MLS/HR: 9 INJECTION, SOLUTION INTRAVENOUS at 05:13

## 2020-09-09 RX ADMIN — ENOXAPARIN SODIUM SCH MG: 40 INJECTION SUBCUTANEOUS at 09:49

## 2020-09-09 NOTE — PN
Progress Note (short form)





- Note


Progress Note: 


66yo F h/o SBO, pt seen and examined at bedside.  Pt states that she feels much 

better today, denies fever, chill, n/v.  Pt had NGT removed yesterday. Pt states

she has been passing flatus.  





                                Last Vital Signs











Temp Pulse Resp BP Pulse Ox


 


 98.3 F   71   18   125/66   98 


 


 09/09/20 09:55  09/09/20 09:55  09/09/20 09:55  09/09/20 09:55  09/09/20 09:55








                                    CBC, BMP





                                 09/09/20 06:25 





                                 09/09/20 06:25 








PE:


Gen: A&O X3


Resp: breathing comfortably


Abd; soft, nontender, nondistended





<Main Alexander - Last Filed: 09/09/20 10:35>





- Note


Progress Note: 





Attending Surgeon:


I personally saw and examined the patient. My examination reveals a patient with

an SBO. I discussed the case with the surgical PA and agree with their findings 

and plan of care with any exceptions as noted. ~ Ricky Nathan MD, FACS 





<Ricky Nathan - Last Filed: 09/22/20 08:18>





Problem List





- Problems


(1) SBO (small bowel obstruction)


Assessment/Plan: 


Plan


   -abd x-ray is concerning for worsening SBO, but cllinically pt looks good, 

will try clears and see if pt tolerates


   -OOB/ambulate


   -dvt ppx


   


Pt discussed with Dr. Nathan who agrees. 


Code(s): K56.69 - OTHER INTESTINAL OBSTRUCTION * DO NOT USE *   





<Main Alexander - Last Filed: 09/09/20 10:35>

## 2020-09-09 NOTE — PN
Teaching Attending Note


Name of Resident: Torri Macias





ATTENDING PHYSICIAN STATEMENT





I saw and evaluated the patient.


I reviewed the resident's note and discussed the case with the resident.


I agree with the resident's findings and plan as documented.








SUBJECTIVE: Much improved abdominal discomfort - multiple BMs overnight and this

AM. No nausea/vomiting/fever/chills. NGT out yesterday.








OBJECTIVE: Afebrile, Hemodynamically stable.





                                Last Vital Signs











Temp Pulse Resp BP Pulse Ox


 


 98.3 F   71   18   125/66   98 


 


 09/09/20 09:55  09/09/20 09:55  09/09/20 09:55  09/09/20 09:55  09/09/20 09:55











Heart - S1, S2, RRR, SM


Lungs - clear to auscultation


Abdomen - Soft, non-tender. Bowel Sounds normal.


Extremities - no edema, no calf tenderness. 


Neuro - AAO x 3. Tone/Power normal.





                         Laboratory Results - last 24 hr











  09/09/20 09/09/20 09/09/20





  06:00 06:25 06:25


 


WBC   8.8 


 


RBC   3.39 L 


 


Hgb   10.4 L 


 


Hct   31.3 L 


 


MCV   92.4 


 


MCH   30.8 


 


MCHC   33.3 


 


RDW   13.0 


 


Plt Count   270 


 


MPV   7.7 


 


Sodium    144


 


Potassium    3.5


 


Chloride    110 H


 


Carbon Dioxide    23


 


Anion Gap    11


 


BUN    9.6


 


Creatinine    0.3 L


 


Est GFR (CKD-EPI)AfAm    141.23


 


Est GFR (CKD-EPI)NonAf    121.85


 


Random Glucose    55 L


 


Hemoglobin A1c %  5.3  


 


Calcium    8.3 L


 


Phosphorus    2.5


 


Magnesium    1.9


 


Total Bilirubin    0.7


 


AST    18


 


ALT    22


 


Alkaline Phosphatase    70


 


Total Protein    6.1 L


 


Albumin    2.8 L








                               Current Medications











Generic Name Dose Route Start Last Admin





  Trade Name Freq  PRN Reason Stop Dose Admin


 


Acetaminophen  1,000 mg  09/08/20 17:22  09/08/20 17:46





  Ofirmev Injection -  IVPB  09/09/20 17:22  1,000 mg





  Q6H PRN   Administration





  HEADACHE  


 


Enoxaparin Sodium  40 mg  09/07/20 10:00  09/09/20 09:49





  Lovenox -  SQ   40 mg





  DAILY CATHIE   Administration


 


Sodium Chloride  1,000 mls @ 100 mls/hr  09/08/20 15:00  09/09/20 05:13





  Normal Saline -  IV   100 mls/hr





  ASDIR CATHIE   Administration


 


Levothyroxine Sodium  75 mcg  09/08/20 10:00  09/09/20 09:49





  Synthroid Injection -  IVPUSH   75 mcg





  DAILY CATHIE   Administration


 


Morphine Sulfate  4 mg  09/06/20 22:49 





  Morphine Sulfate  IVPUSH  





  Q4H PRN  





  PAIN LEVEL 7 - 10  


 


Ondansetron HCl  4 mg  09/06/20 22:50  09/07/20 20:22





  Zofran Injection  IVPUSH   4 mg





  Q4H PRN   Administration





  NAUSEA AND/OR VOMITING  








                                Home Medications











 Medication  Instructions  Recorded


 


Levothyroxine [Synthroid -] 125 mcg PO DAILY 08/30/17


 


Valsartan [Diovan] 40 mg PO DAILY 08/30/17


 


Ergocalciferol (Vitamin D2) 50,000 unit PO WEEKLY 09/07/20





[Vitamin D2]  














                                        








ASSESSMENT AND PLAN:





65 year old female with history of GERD, HTN, Hypothyroidism, DM 2, multiple 

intra-abdominal surgeries (s/p hernia repair, s/p DOMO, s/p cholecystectomy, s/p 

appendectomy, s/p gastric sleeve), patent PFO, history of recurrent SBO, 

presents with abdominal pain and cramping. 





1. Acute SBO - recurrent, resolving


CT A/P - SBO


Passed BMs, pain/distension much improved.


Leukocytosis resolved.


Advance diet to clears as per Surgery. Continue IV fluids.


Surgery following.





2. Hypothyroidism - continue Levothyroxine.





3. HTN - Valsartan held for now due to borderline BP.





4. DM 2 - A1c wnl, appears to be diet controlled. 





5. Patent PFO with L to R flow on recent echo 7/20 - no further Ix as per Cardio

- for out-patient follow up. 





DVT Px - Lovenox SQ

## 2020-09-09 NOTE — PN
Physical Exam: 


SUBJECTIVE: 


Patient seen and examined. States much improved symptoms. BM overnight, and 

multiple this AM. Denies fever/chills/n/v. NGT out this AM.








OBJECTIVE:





                                   Vital Signs











 Period  Temp  Pulse  Resp  BP Sys/Anglin  Pulse Ox


 


 Last 24 Hr  98.3 F-99.0 F  59-74  18-18  125-148/60-89  97-98











GENERAL: The patient is awake, alert, and fully oriented, in no acute distress.


HEAD: Normal with no signs of trauma.


EYES: PERRL, extraocular movements intact, sclera anicteric, conjunctiva clear. 

No ptosis. 


ENT: Ears normal, nares patent, oropharynx clear without exudates, moist mucous 

membranes.


NECK: Trachea midline, full range of motion, supple. 


LUNGS: Breath sounds equal, clear to auscultation bilaterally, no wheezes, no 

crackles, no accessory muscle use. 


HEART: Regular rate and rhythm, S1, S2 without murmur, rub or gallop.


ABDOMEN: Soft, nontender, nondistended, normoactive bowel sounds, no guarding, 

no rebound, no hepatosplenomegaly, no masses.


EXTREMITIES: 2+ pulses, warm, well-perfused, no edema. 


NEUROLOGICAL: Cranial nerves II through XII grossly intact. Normal speech, gait 

not observed.


PSYCH: Normal mood, normal affect.


SKIN: Warm, dry, normal turgor, no rashes or lesions noted














                         Laboratory Results - last 24 hr











  09/09/20 09/09/20 09/09/20





  06:00 06:25 06:25


 


WBC   8.8 


 


RBC   3.39 L 


 


Hgb   10.4 L 


 


Hct   31.3 L 


 


MCV   92.4 


 


MCH   30.8 


 


MCHC   33.3 


 


RDW   13.0 


 


Plt Count   270 


 


MPV   7.7 


 


Sodium    144


 


Potassium    3.5


 


Chloride    110 H


 


Carbon Dioxide    23


 


Anion Gap    11


 


BUN    9.6


 


Creatinine    0.3 L


 


Est GFR (CKD-EPI)AfAm    141.23


 


Est GFR (CKD-EPI)NonAf    121.85


 


Random Glucose    55 L


 


Hemoglobin A1c %  5.3  


 


Calcium    8.3 L


 


Phosphorus    2.5


 


Magnesium    1.9


 


Total Bilirubin    0.7


 


AST    18


 


ALT    22


 


Alkaline Phosphatase    70


 


Total Protein    6.1 L


 


Albumin    2.8 L








Active Medications











Generic Name Dose Route Start Last Admin





  Trade Name Freq  PRN Reason Stop Dose Admin


 


Acetaminophen  1,000 mg  09/08/20 17:22  09/08/20 17:46





  Ofirmev Injection -  IVPB  09/09/20 17:22  1,000 mg





  Q6H PRN   Administration





  HEADACHE  


 


Enoxaparin Sodium  40 mg  09/07/20 10:00  09/09/20 09:49





  Lovenox -  SQ   40 mg





  DAILY CATHIE   Administration


 


Levothyroxine Sodium  75 mcg  09/08/20 10:00  09/09/20 09:49





  Synthroid Injection -  IVPUSH   75 mcg





  DAILY CATHIE   Administration


 


Morphine Sulfate  4 mg  09/06/20 22:49 





  Morphine Sulfate  IVPUSH  





  Q4H PRN  





  PAIN LEVEL 7 - 10  


 


Ondansetron HCl  4 mg  09/06/20 22:50  09/07/20 20:22





  Zofran Injection  IVPUSH   4 mg





  Q4H PRN   Administration





  NAUSEA AND/OR VOMITING  











ASSESSMENT/PLAN:


Pt is a 65 year old female with PMHx of GERD, HTN, hypothyroid, DM2, multiple 

intraabdominal surgeries (hernia repair, DOMO, cholecystectomy, appendectomy, and

gastric sleeve), and episodes of SBO in the past, presenting with abdominal pain

admitted for SBO.





#Recurrent SBO


-multiple episodes of SBO in the past


-IVF


-advanced to clear liquids


-NGT out


-Morphine 4mg prn for pain


-Surgery consulted --> continue management, serial abdominal x-rays, possible 

intervention if no improvement in sx


-Advanced diet to clears as per surgery





#Hx of hypothyroid


-Continue synthroid





#Hx of HTN


-Held home telmisartan for now


-monitor BP





#Hx of DM2


-A1c 5.3


-not on meds at home; diet controlled





PPx


Lovenox





FEN


Clear diet


Monitor electrolytes


NS 100cc/hr





Visit type





- Emergency Visit


Emergency Visit: Yes


ED Registration Date: 09/06/20


Care time: The patient presented to the Emergency Department on the above date 

and was hospitalized for further evaluation of their emergent condition.





- New Patient


This patient is new to me today: No





- Critical Care


Critical Care patient: No





ATTENDING PHYSICIAN STATEMENT





I saw and evaluated the patient.


I reviewed the resident's note and discussed the case with the resident.


I agree with the resident's findings and plan as documented.








SUBJECTIVE:








OBJECTIVE:








ASSESSMENT AND PLAN:

## 2020-09-09 NOTE — PN
Progress Note (short form)





- Note


Progress Note: 


cc: abd pain





s: abd pain improving. no chest pain, palps, dizziness, dyspnea





                               Current Medications











Generic Name Dose Route Start Last Admin





  Trade Name Malou  PRN Reason Stop Dose Admin


 


Acetaminophen  1,000 mg  09/08/20 17:22  09/08/20 17:46





  Ofirmev Injection -  IVPB  09/09/20 17:22  1,000 mg





  Q6H PRN   Administration





  HEADACHE  


 


Enoxaparin Sodium  40 mg  09/07/20 10:00  09/09/20 09:49





  Lovenox -  SQ   40 mg





  DAILY CATHIE   Administration


 


Sodium Chloride  1,000 mls @ 100 mls/hr  09/08/20 15:00  09/09/20 05:13





  Normal Saline -  IV   100 mls/hr





  ASDIR CATHIE   Administration


 


Levothyroxine Sodium  75 mcg  09/08/20 10:00  09/09/20 09:49





  Synthroid Injection -  IVPUSH   75 mcg





  DAILY CATHIE   Administration


 


Morphine Sulfate  4 mg  09/06/20 22:49 





  Morphine Sulfate  IVPUSH  





  Q4H PRN  





  PAIN LEVEL 7 - 10  


 


Ondansetron HCl  4 mg  09/06/20 22:50  09/07/20 20:22





  Zofran Injection  IVPUSH   4 mg





  Q4H PRN   Administration





  NAUSEA AND/OR VOMITING  








                                   Vital Signs











 Period  Temp  Pulse  Resp  BP Sys/Anglin  Pulse Ox


 


 Last 24 Hr  98.2 F-99.0 F  61-76  18-18  125-158/63-89  97-98














Constitutional: Yes: Well Nourished, No Distress, Calm


Eyes: Yes: Conjunctiva Clear, EOM Intact


HENT: Yes: Atraumatic, Normocephalic


Neck: Yes: Supple, Trachea Midline


Respiratory: Yes: Regular, CTA Bilaterally


Gastrointestinal: Yes: Soft.  No: Tenderness


Cardiovascular: Yes: Regular Rate and Rhythm


JVD: No


Heart Sounds: Yes: S1, S2


Murmur: Yes: Systolic Murmur (II/VI RUSB)


Musculoskeletal: No: Back Pain


Extremities: No: Cold


Edema: No


Integumentary: No: Jaundice


Neurological: Yes: Alert, Oriented


Psychiatric: No: Agitated





Assessment/Plan


echo 7/2020 nl LV/RV function, small PFO with L to R flow, tr TR, tr AL





EKG: sinus, nl intervals, no ischemic changes





preop, abd pain, SBO


- manage per surgery


- recent echo unremarkable


- she is at acceptable risk for abdominal surgery if needed, no further testing 

prior to procedure





murmur


- recent office echo unremarkable, no further testing here





hypothyroidism


- manage per primary





HTN


- holding home meds while NPO


- monitor BP for now





PFO


- outpatient follow up

## 2020-09-10 VITALS — DIASTOLIC BLOOD PRESSURE: 57 MMHG | SYSTOLIC BLOOD PRESSURE: 123 MMHG | HEART RATE: 65 BPM | TEMPERATURE: 98.4 F

## 2020-09-10 LAB
ALBUMIN SERPL-MCNC: 2.9 G/DL (ref 3.4–5)
ALP SERPL-CCNC: 67 U/L (ref 45–117)
ALT SERPL-CCNC: 23 U/L (ref 13–61)
ANION GAP SERPL CALC-SCNC: 5 MMOL/L (ref 8–16)
AST SERPL-CCNC: 20 U/L (ref 15–37)
BASOPHILS # BLD: 0.5 % (ref 0–2)
BILIRUB SERPL-MCNC: 0.5 MG/DL (ref 0.2–1)
BUN SERPL-MCNC: 5.3 MG/DL (ref 7–18)
CALCIUM SERPL-MCNC: 8.5 MG/DL (ref 8.5–10.1)
CHLORIDE SERPL-SCNC: 111 MMOL/L (ref 98–107)
CO2 SERPL-SCNC: 28 MMOL/L (ref 21–32)
CREAT SERPL-MCNC: 0.4 MG/DL (ref 0.55–1.3)
DEPRECATED RDW RBC AUTO: 13 % (ref 11.6–15.6)
EOSINOPHIL # BLD: 1.7 % (ref 0–4.5)
GLUCOSE SERPL-MCNC: 81 MG/DL (ref 74–106)
HCT VFR BLD CALC: 32.2 % (ref 32.4–45.2)
HGB BLD-MCNC: 10.8 GM/DL (ref 10.7–15.3)
LYMPHOCYTES # BLD: 30.5 % (ref 8–40)
MAGNESIUM SERPL-MCNC: 2 MG/DL (ref 1.8–2.4)
MCH RBC QN AUTO: 30.4 PG (ref 25.7–33.7)
MCHC RBC AUTO-ENTMCNC: 33.4 G/DL (ref 32–36)
MCV RBC: 91 FL (ref 80–96)
MONOCYTES # BLD AUTO: 6.2 % (ref 3.8–10.2)
NEUTROPHILS # BLD: 61.1 % (ref 42.8–82.8)
PHOSPHATE SERPL-MCNC: 2.4 MG/DL (ref 2.5–4.9)
PLATELET # BLD AUTO: 277 K/MM3 (ref 134–434)
PMV BLD: 7.4 FL (ref 7.5–11.1)
POTASSIUM SERPLBLD-SCNC: 3.5 MMOL/L (ref 3.5–5.1)
PROT SERPL-MCNC: 6.2 G/DL (ref 6.4–8.2)
RBC # BLD AUTO: 3.54 M/MM3 (ref 3.6–5.2)
SODIUM SERPL-SCNC: 144 MMOL/L (ref 136–145)
WBC # BLD AUTO: 7.5 K/MM3 (ref 4–10)

## 2020-09-10 RX ADMIN — LEVOTHYROXINE SODIUM ANHYDROUS SCH MCG: 500 INJECTION, POWDER, LYOPHILIZED, FOR SOLUTION INTRAVENOUS at 12:26

## 2020-09-10 RX ADMIN — ENOXAPARIN SODIUM SCH MG: 40 INJECTION SUBCUTANEOUS at 12:26

## 2020-09-10 NOTE — DS
Physical Exam: 


SUBJECTIVE: Patient seen and examined








OBJECTIVE:





                                   Vital Signs











 Period  Temp  Pulse  Resp  BP Sys/Anglin  Pulse Ox


 


 Last 24 Hr  97.9 F-98.4 F  57-65  17-20  123-142/55-71  94-99








PHYSICAL EXAM





GENERAL: The patient is awake, alert, and fully oriented, in no acute distress.


HEAD: Normal with no signs of trauma.


EYES: PERRL, extraocular movements intact, sclera anicteric, conjunctiva clear. 


ENT: Ears normal, nares patent, oropharynx clear without exudates, moist mucous 

membranes.


NECK: Trachea midline, full range of motion, supple. 


LUNGS: Breath sounds equal, clear to auscultation bilaterally, no wheezes, no 

crackles, no accessory muscle use. 


HEART: Regular rate and rhythm, S1, S2 without murmur, rub or gallop.


ABDOMEN: Soft, nontender, nondistended, normoactive bowel sounds, no guarding, 

no rebound, no hepatosplenomegaly, no masses.


EXTREMITIES: 2+ pulses, warm, well-perfused, no edema. 


NEUROLOGICAL: Cranial nerves II through XII grossly intact. Normal speech, gait 

not observed.


PSYCH: Normal mood, normal affect.


SKIN: Warm, dry, normal turgor, no rashes or lesions noted.





LABS


                         Laboratory Results - last 24 hr











  09/10/20 09/10/20





  06:45 06:45


 


WBC  7.5 


 


RBC  3.54 L 


 


Hgb  10.8 


 


Hct  32.2 L 


 


MCV  91.0 


 


MCH  30.4 


 


MCHC  33.4 


 


RDW  13.0 


 


Plt Count  277 


 


MPV  7.4 L 


 


Absolute Neuts (auto)  4.6 


 


Neutrophils %  61.1  D 


 


Lymphocytes %  30.5  D 


 


Monocytes %  6.2 


 


Eosinophils %  1.7  D 


 


Basophils %  0.5 


 


Nucleated RBC %  0 


 


Sodium   144


 


Potassium   3.5


 


Chloride   111 H


 


Carbon Dioxide   28


 


Anion Gap   5 L


 


BUN   5.3 L


 


Creatinine   0.4 L


 


Est GFR (CKD-EPI)AfAm   128.47


 


Est GFR (CKD-EPI)NonAf   110.85


 


Random Glucose   81


 


Calcium   8.5


 


Phosphorus   2.4 L


 


Magnesium   2.0


 


Total Bilirubin   0.5


 


AST   20


 


ALT   23


 


Alkaline Phosphatase   67


 


Total Protein   6.2 L


 


Albumin   2.9 L











HOSPITAL COURSE:





Date of Admission:09/06/20





Pt is a 65 year old female with PMHx GERD, HTN, Hypothyroidism, DM 2, multiple 

intra-abdominal surgeries (s/p hernia repair, s/p DOMO, s/p cholecystectomy, s/p 

appendectomy, s/p gastric sleeve), patent PFO, history of recurrent SBO, 

presents with abdominal pain and cramping admitted for SBO. Pt had serial 

imaging which showed SBO. Pt was evaluated by gen surg, and was on IVF/NG 

tube/NPO and had significant improvement in symptoms. Had bowel movement and 

improvement in abdominal pain. Advanced diet to regular and tolerated on 

discharge. Pt continued on levothyroxine for hypothyroidism. Resumed valsartan 

on discharge. A1c normal, not on any diabetes medications, appears to be diet 

controlled. Had recent echo in 7/20, has patent PFO but no further workup.





Pt to follow up with PCP, surgery and cardio outpatient.





Date of Discharge: 09/10/20











Minutes to complete discharge: 36





Discharge Summary


Problems reviewed: Yes


Reason For Visit: SMALL BOWEL OBSTRUCTION


Current Active Problems





SBO (small bowel obstruction) (Acute)








Condition: Stable





- Instructions


Diet, Activity, Other Instructions: 


You came to the hospital for abdominal pain, and were found to have obstruction 

in your intestines (similar to what you had in the past). You were given IV 

fluids, had tube placed into your stomach from your nose, and were not eating to

help your bowel rest and regain its function. You eventually were able to eat, 

passed bowel movement and had improvement in your symptoms.





Please continue your home medications as prescribed.





Please follow up with your primary care physician, Dr. Cruz, within 1 week for 

your overall health management.


Please follow up with your general surgeon, Dr. Nathan, within 7-10 days for 

follow up of your resolving obstruction.


Please follow up with your cardiologist, Dr. Miguel, within 2 weeks.





If you have any new, worsening, or changing symptoms please call 911 or return 

to the ED.


Referrals: 


Ricky Nathan MD [Staff Physician] - 


Philip Miguel MD [Staff Physician] - 


Cristine Cruz MD [Primary Care Provider] - 


Disposition: HOME





- Home Medications


Comprehensive Discharge Medication List: 


Ambulatory Orders





Levothyroxine [Synthroid -] 125 mcg PO DAILY 08/30/17 


Valsartan [Diovan] 40 mg PO DAILY 08/30/17 


Ergocalciferol (Vitamin D2) [Vitamin D2] 50,000 unit PO WEEKLY 09/07/20 








This patient is new to me today: Yes


Date on this admission: 09/20/20


Emergency Visit: No


Critical Care patient: No





- Discharge Referral


Referred to [facility] No





ATTENDING PHYSICIAN STATEMENT





I saw and evaluated the patient.


I reviewed the resident's note and discussed the case with the resident.


I agree with the resident's findings and plan as documented.








SUBJECTIVE:








OBJECTIVE:








ASSESSMENT AND PLAN:

## 2020-09-10 NOTE — PN
Progress Note (short form)





- Note


Progress Note: 





cc: abd pain





s: abd pain improving. no chest pain, palps, dizziness, dyspnea





                              Current Medications











Generic Name Dose Route Start Last Admin





  Trade Name Malou  PRN Reason Stop Dose Admin


 


Enoxaparin Sodium  40 mg  09/07/20 10:00  09/09/20 09:49





  Lovenox -  SQ   40 mg





  DAILY CATHIE   Administration


 


Potassium Phosphate 15 mm/  255 mls @ 62.5 mls/hr  09/10/20 09:26 





  Sodium Chloride  IVPB  09/10/20 13:30 





  ONCE ONE  


 


Levothyroxine Sodium  75 mcg  09/08/20 10:00  09/09/20 09:49





  Synthroid Injection -  IVPUSH   75 mcg





  DAILY CATHIE   Administration


 


Ondansetron HCl  4 mg  09/06/20 22:50  09/07/20 20:22





  Zofran Injection  IVPUSH   4 mg





  Q4H PRN   Administration





  NAUSEA AND/OR VOMITING  








                                   Vital Signs











 Period  Temp  Pulse  Resp  BP Sys/Anglin  Pulse Ox


 


 Last 24 Hr  97.9 F-98.4 F  57-64  17-18  126-142/55-71  94-99














Constitutional: Yes: Well Nourished, No Distress, Calm


Eyes: Yes: Conjunctiva Clear, EOM Intact


HENT: Yes: Atraumatic, Normocephalic


Neck: Yes: Supple, Trachea Midline


Respiratory: Yes: Regular, CTA Bilaterally


Gastrointestinal: Yes: Soft.  No: Tenderness


Cardiovascular: Yes: Regular Rate and Rhythm


JVD: No


Heart Sounds: Yes: S1, S2


Murmur: Yes: Systolic Murmur (II/VI RUSB)


Musculoskeletal: No: Back Pain


Extremities: No: Cold


Edema: No


Integumentary: No: Jaundice


Neurological: Yes: Alert, Oriented


Psychiatric: No: Agitated








                                    CBC, BMP





                                 09/10/20 06:45 





                                 09/10/20 06:45 








Assessment/Plan


echo 7/2020 nl LV/RV function, small PFO with L to R flow, tr TR, tr MA





EKG: sinus, nl intervals, no ischemic changes





preop, abd pain, SBO


- manage per surgery


- recent echo unremarkable


- she is at acceptable risk for abdominal surgery if needed, no further testing 

prior to procedure





murmur


- recent office echo unremarkable, no further testing here





hypothyroidism


- manage per primary





HTN


- holding home meds while NPO


- monitor BP for now





PFO


- outpatient follow up

## 2020-09-10 NOTE — PN
Progress Note (short form)





- Note


Progress Note: 





Surgery:





Pt had BM and is tolerating a clear liquid diet. 





                                   Vital Signs











 Period  Temp  Pulse  Resp  BP Sys/Anglin  Pulse Ox


 


 Last 24 Hr  97.9 F-98.4 F  57-64  17-18  126-142/55-71  94-99








GEN: A&0x3, NAD


ABD: soft, non-distended, non-tender





                                    CBC, BMP





                                 09/10/20 06:45 





                                 09/10/20 06:45 





A/P: 66 yo female with resolving SBO, h/o of multiple abdominal surgeries


   Adv diet to regular for lunch


   No further surgical issues, please reconsult as needed


   D/w Dr. Nathan





<Venice Fernandez - Last Filed: 09/10/20 10:20>





- Note


Progress Note: 





Attending Surgeon:


I personally saw and examined the patient. My examination reveals a patient with

a SBO. I discussed the case with the surgical PA and agree with their findings 

and plan of care with any exceptions as noted. ~ Ricky Nathan MD, FACS 





<Ricky Nathan - Last Filed: 09/22/20 08:15>

## 2020-09-10 NOTE — PN
Teaching Attending Note


Name of Resident: Torri Macias





ATTENDING PHYSICIAN STATEMENT





I saw and evaluated the patient.


I reviewed the resident's note and discussed the case with the resident.


I agree with the resident's findings and plan as documented.








SUBJECTIVE: Improved abdominal discomfort - BMs +. No 

nausea/vomiting/fever/chills. 








OBJECTIVE: Afebrile, Hemodynamically stable.





                                Last Vital Signs











Temp Pulse Resp BP Pulse Ox


 


 98.4 F   65   20   123/57 L  99 


 


 09/10/20 13:38  09/10/20 13:38  09/10/20 13:38  09/10/20 13:38  09/10/20 09:00











Heart - S1, S2, RRR, SM


Lungs - clear to auscultation


Abdomen - Soft, non-tender. Bowel Sounds normal.


Extremities - no edema, no calf tenderness. 


Neuro - AAO x 3. Tone/Power normal.





                         Laboratory Results - last 24 hr











  09/10/20 09/10/20





  06:45 06:45


 


WBC  7.5 


 


RBC  3.54 L 


 


Hgb  10.8 


 


Hct  32.2 L 


 


MCV  91.0 


 


MCH  30.4 


 


MCHC  33.4 


 


RDW  13.0 


 


Plt Count  277 


 


MPV  7.4 L 


 


Absolute Neuts (auto)  4.6 


 


Neutrophils %  61.1  D 


 


Lymphocytes %  30.5  D 


 


Monocytes %  6.2 


 


Eosinophils %  1.7  D 


 


Basophils %  0.5 


 


Nucleated RBC %  0 


 


Sodium   144


 


Potassium   3.5


 


Chloride   111 H


 


Carbon Dioxide   28


 


Anion Gap   5 L


 


BUN   5.3 L


 


Creatinine   0.4 L


 


Est GFR (CKD-EPI)AfAm   128.47


 


Est GFR (CKD-EPI)NonAf   110.85


 


Random Glucose   81


 


Calcium   8.5


 


Phosphorus   2.4 L


 


Magnesium   2.0


 


Total Bilirubin   0.5


 


AST   20


 


ALT   23


 


Alkaline Phosphatase   67


 


Total Protein   6.2 L


 


Albumin   2.9 L








                               Current Medications











Generic Name Dose Route Start Last Admin





  Trade Name Freq  PRN Reason Stop Dose Admin


 


Enoxaparin Sodium  40 mg  09/07/20 10:00  09/10/20 12:26





  Lovenox -  SQ   40 mg





  DAILY CATHIE   Administration


 


Levothyroxine Sodium  75 mcg  09/08/20 10:00  09/10/20 12:26





  Synthroid Injection -  IVPUSH   75 mcg





  DAILY CATHIE   Administration


 


Ondansetron HCl  4 mg  09/06/20 22:50  09/07/20 20:22





  Zofran Injection  IVPUSH   4 mg





  Q4H PRN   Administration





  NAUSEA AND/OR VOMITING  








                                Home Medications











 Medication  Instructions  Recorded


 


Levothyroxine [Synthroid -] 125 mcg PO DAILY 08/30/17


 


Valsartan [Diovan] 40 mg PO DAILY 08/30/17


 


Ergocalciferol (Vitamin D2) 50,000 unit PO WEEKLY 09/07/20





[Vitamin D2]  














ASSESSMENT AND PLAN:





65 year old female with history of GERD, HTN, Hypothyroidism, DM 2, multiple 

intra-abdominal surgeries (s/p hernia repair, s/p DOMO, s/p cholecystectomy, s/p 

appendectomy, s/p gastric sleeve), patent PFO, history of recurrent SBO, 

presents with abdominal pain and cramping. 





1. Acute SBO - recurrent, resolved


CT A/P - SBO


Passed BMs, pain/distension much improved.


Leukocytosis resolved.


Surgery advanced diet to regular - if tolerates, can be discharged home.





2. Hypothyroidism - continue Levothyroxine.





3. HTN - Valsartan to resume on discharge





4. DM 2 - A1c wnl, appears to be diet controlled. 





5. Patent PFO with L to R flow on recent echo 7/20 - no further Ix as per Cardio

- for out-patient follow up. 





DVT Px - Lovenox SQ


Medically/Surgically clear for discharge once tolerating regular diet with no 

abdominal discomfort.

## 2021-07-08 ENCOUNTER — HOSPITAL ENCOUNTER (INPATIENT)
Dept: HOSPITAL 74 - JER | Age: 66
LOS: 2 days | Discharge: HOME | DRG: 247 | End: 2021-07-10
Attending: INTERNAL MEDICINE | Admitting: GENERAL ACUTE CARE HOSPITAL
Payer: COMMERCIAL

## 2021-07-08 VITALS — BODY MASS INDEX: 32 KG/M2

## 2021-07-08 DIAGNOSIS — K21.9: ICD-10-CM

## 2021-07-08 DIAGNOSIS — E78.5: ICD-10-CM

## 2021-07-08 DIAGNOSIS — K56.50: Primary | ICD-10-CM

## 2021-07-08 DIAGNOSIS — E66.9: ICD-10-CM

## 2021-07-08 DIAGNOSIS — E03.9: ICD-10-CM

## 2021-07-08 DIAGNOSIS — I10: ICD-10-CM

## 2021-07-08 DIAGNOSIS — E55.9: ICD-10-CM

## 2021-07-08 DIAGNOSIS — Z96.643: ICD-10-CM

## 2021-07-08 LAB
ALBUMIN SERPL-MCNC: 3.7 G/DL (ref 3.4–5)
ALP SERPL-CCNC: 74 U/L (ref 45–117)
ALT SERPL-CCNC: 24 U/L (ref 13–61)
ANION GAP SERPL CALC-SCNC: 7 MMOL/L (ref 8–16)
AST SERPL-CCNC: 16 U/L (ref 15–37)
BILIRUB SERPL-MCNC: 0.4 MG/DL (ref 0.2–1)
BUN SERPL-MCNC: 19.4 MG/DL (ref 7–18)
CALCIUM SERPL-MCNC: 9.5 MG/DL (ref 8.5–10.1)
CHLORIDE SERPL-SCNC: 107 MMOL/L (ref 98–107)
CO2 SERPL-SCNC: 25 MMOL/L (ref 21–32)
CREAT SERPL-MCNC: 0.4 MG/DL (ref 0.55–1.3)
DEPRECATED RDW RBC AUTO: 13.6 % (ref 11.6–15.6)
GLUCOSE SERPL-MCNC: 112 MG/DL (ref 74–106)
HCT VFR BLD CALC: 35.4 % (ref 32.4–45.2)
HGB BLD-MCNC: 11.9 GM/DL (ref 10.7–15.3)
INR BLD: 1.08 (ref 0.83–1.09)
LIPASE SERPL-CCNC: 101 U/L (ref 73–393)
MCH RBC QN AUTO: 30.1 PG (ref 25.7–33.7)
MCHC RBC AUTO-ENTMCNC: 33.6 G/DL (ref 32–36)
MCV RBC: 89.6 FL (ref 80–96)
PLATELET # BLD AUTO: 319 10^3/UL (ref 134–434)
PMV BLD: 7 FL (ref 7.5–11.1)
PROT SERPL-MCNC: 7.7 G/DL (ref 6.4–8.2)
PT PNL PPP: 13.3 SEC (ref 9.7–13)
RBC # BLD AUTO: 3.95 M/MM3 (ref 3.6–5.2)
SODIUM SERPL-SCNC: 139 MMOL/L (ref 136–145)
WBC # BLD AUTO: 10.1 K/MM3 (ref 4–10)

## 2021-07-08 PROCEDURE — U0003 INFECTIOUS AGENT DETECTION BY NUCLEIC ACID (DNA OR RNA); SEVERE ACUTE RESPIRATORY SYNDROME CORONAVIRUS 2 (SARS-COV-2) (CORONAVIRUS DISEASE [COVID-19]), AMPLIFIED PROBE TECHNIQUE, MAKING USE OF HIGH THROUGHPUT TECHNOLOGIES AS DESCRIBED BY CMS-2020-01-R: HCPCS

## 2021-07-08 PROCEDURE — U0005 INFEC AGEN DETEC AMPLI PROBE: HCPCS

## 2021-07-08 PROCEDURE — C9803 HOPD COVID-19 SPEC COLLECT: HCPCS

## 2021-07-08 RX ADMIN — SODIUM CHLORIDE, POTASSIUM CHLORIDE, SODIUM LACTATE AND CALCIUM CHLORIDE SCH MLS/HR: 600; 310; 30; 20 INJECTION, SOLUTION INTRAVENOUS at 11:37

## 2021-07-08 RX ADMIN — LEVOTHYROXINE SODIUM SCH MCG: 125 TABLET ORAL at 13:35

## 2021-07-08 RX ADMIN — ENOXAPARIN SODIUM SCH MG: 40 INJECTION SUBCUTANEOUS at 11:25

## 2021-07-08 RX ADMIN — LOSARTAN POTASSIUM SCH MG: 50 TABLET, FILM COATED ORAL at 13:35

## 2021-07-09 LAB
ALBUMIN SERPL-MCNC: 3.6 G/DL (ref 3.4–5)
ALP SERPL-CCNC: 82 U/L (ref 45–117)
ALT SERPL-CCNC: 24 U/L (ref 13–61)
ANION GAP SERPL CALC-SCNC: 8 MMOL/L (ref 8–16)
AST SERPL-CCNC: 18 U/L (ref 15–37)
BASOPHILS # BLD: 0.3 % (ref 0–2)
BILIRUB SERPL-MCNC: 0.7 MG/DL (ref 0.2–1)
BUN SERPL-MCNC: 9.1 MG/DL (ref 7–18)
CALCIUM SERPL-MCNC: 9.2 MG/DL (ref 8.5–10.1)
CHLORIDE SERPL-SCNC: 104 MMOL/L (ref 98–107)
CO2 SERPL-SCNC: 29 MMOL/L (ref 21–32)
CREAT SERPL-MCNC: 0.4 MG/DL (ref 0.55–1.3)
DEPRECATED RDW RBC AUTO: 13.3 % (ref 11.6–15.6)
EOSINOPHIL # BLD: 1.1 % (ref 0–4.5)
GLUCOSE SERPL-MCNC: 78 MG/DL (ref 74–106)
HCT VFR BLD CALC: 37.6 % (ref 32.4–45.2)
HGB BLD-MCNC: 12.5 GM/DL (ref 10.7–15.3)
LYMPHOCYTES # BLD: 30.3 % (ref 8–40)
MAGNESIUM SERPL-MCNC: 2.1 MG/DL (ref 1.8–2.4)
MCH RBC QN AUTO: 30 PG (ref 25.7–33.7)
MCHC RBC AUTO-ENTMCNC: 33.1 G/DL (ref 32–36)
MCV RBC: 90.7 FL (ref 80–96)
MONOCYTES # BLD AUTO: 4.3 % (ref 3.8–10.2)
NEUTROPHILS # BLD: 64 % (ref 42.8–82.8)
PHOSPHATE SERPL-MCNC: 3.8 MG/DL (ref 2.5–4.9)
PLATELET # BLD AUTO: 338 10^3/UL (ref 134–434)
PMV BLD: 7.5 FL (ref 7.5–11.1)
PROT SERPL-MCNC: 7.4 G/DL (ref 6.4–8.2)
RBC # BLD AUTO: 4.15 M/MM3 (ref 3.6–5.2)
SODIUM SERPL-SCNC: 141 MMOL/L (ref 136–145)
WBC # BLD AUTO: 7.4 K/MM3 (ref 4–10)

## 2021-07-09 RX ADMIN — SODIUM CHLORIDE, POTASSIUM CHLORIDE, SODIUM LACTATE AND CALCIUM CHLORIDE SCH MLS/HR: 600; 310; 30; 20 INJECTION, SOLUTION INTRAVENOUS at 05:15

## 2021-07-09 RX ADMIN — LOSARTAN POTASSIUM SCH MG: 50 TABLET, FILM COATED ORAL at 10:58

## 2021-07-09 RX ADMIN — LEVOTHYROXINE SODIUM SCH MCG: 125 TABLET ORAL at 06:44

## 2021-07-09 RX ADMIN — ENOXAPARIN SODIUM SCH MG: 40 INJECTION SUBCUTANEOUS at 10:58

## 2021-07-10 VITALS — DIASTOLIC BLOOD PRESSURE: 60 MMHG | HEART RATE: 80 BPM | TEMPERATURE: 98.5 F | SYSTOLIC BLOOD PRESSURE: 119 MMHG

## 2021-07-10 RX ADMIN — LOSARTAN POTASSIUM SCH MG: 50 TABLET, FILM COATED ORAL at 09:05

## 2021-07-10 RX ADMIN — ENOXAPARIN SODIUM SCH MG: 40 INJECTION SUBCUTANEOUS at 09:05

## 2021-07-10 RX ADMIN — LEVOTHYROXINE SODIUM SCH MCG: 125 TABLET ORAL at 06:53
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
## 2022-11-07 ENCOUNTER — OFFICE (OUTPATIENT)
Dept: URBAN - METROPOLITAN AREA CLINIC 121 | Facility: CLINIC | Age: 67
Setting detail: OPHTHALMOLOGY
End: 2022-11-07
Payer: MEDICARE

## 2022-11-07 DIAGNOSIS — H35.033: ICD-10-CM

## 2022-11-07 DIAGNOSIS — H01.004: ICD-10-CM

## 2022-11-07 DIAGNOSIS — H43.393: ICD-10-CM

## 2022-11-07 DIAGNOSIS — H16.223: ICD-10-CM

## 2022-11-07 DIAGNOSIS — Z96.1: ICD-10-CM

## 2022-11-07 DIAGNOSIS — H40.033: ICD-10-CM

## 2022-11-07 DIAGNOSIS — H43.813: ICD-10-CM

## 2022-11-07 DIAGNOSIS — H01.001: ICD-10-CM

## 2022-11-07 DIAGNOSIS — H40.013: ICD-10-CM

## 2022-11-07 DIAGNOSIS — H35.3131: ICD-10-CM

## 2022-11-07 PROCEDURE — 99213 OFFICE O/P EST LOW 20 MIN: CPT | Performed by: OPHTHALMOLOGY

## 2022-11-07 PROCEDURE — 92134 CPTRZ OPH DX IMG PST SGM RTA: CPT | Performed by: OPHTHALMOLOGY

## 2022-11-07 ASSESSMENT — SUPERFICIAL PUNCTATE KERATITIS (SPK)
OD_SPK: T
OS_SPK: T

## 2022-11-07 ASSESSMENT — CONFRONTATIONAL VISUAL FIELD TEST (CVF)
OD_FINDINGS: FULL
OS_FINDINGS: FULL

## 2022-11-07 ASSESSMENT — REFRACTION_CURRENTRX
OS_OVR_VA: 20/
OD_OVR_VA: 20/
OD_AXIS: 004
OS_AXIS: 010
OS_ADD: +2.00
OD_SPHERE: PLANO
OS_CYLINDER: +4.00
OD_CYLINDER: +2.50
OS_SPHERE: -0.50
OD_ADD: +2.00

## 2022-11-07 ASSESSMENT — PACHYMETRY
OD_CT_UM: 506
OS_CT_CORRECTION: 1
OD_CT_CORRECTION: 3
OS_CT_UM: 533

## 2022-11-07 ASSESSMENT — VISUAL ACUITY
OS_BCVA: 20/50
OD_BCVA: 20/30

## 2022-11-07 ASSESSMENT — KERATOMETRY
OD_K2POWER_DIOPTERS: 46.00
OS_K2POWER_DIOPTERS: 46.50
OS_K1POWER_DIOPTERS: 45.25
OD_K1POWER_DIOPTERS: 44.50
OD_AXISANGLE_DEGREES: 018
OS_AXISANGLE_DEGREES: 174
METHOD_AUTO_MANUAL: AUTO

## 2022-11-07 ASSESSMENT — REFRACTION_AUTOREFRACTION
OS_SPHERE: -1.25
OS_CYLINDER: +1.50
OD_CYLINDER: +2.50
OS_AXIS: 172
OD_AXIS: 010
OD_SPHERE: -2.00

## 2022-11-07 ASSESSMENT — AXIALLENGTH_DERIVED
OS_AL: 22.9333
OD_AL: 23.2465

## 2022-11-07 ASSESSMENT — TONOMETRY
OS_IOP_MMHG: 19
OD_IOP_MMHG: 19

## 2022-11-07 ASSESSMENT — SPHEQUIV_DERIVED
OS_SPHEQUIV: -0.5
OD_SPHEQUIV: -0.75

## 2022-12-19 ENCOUNTER — OFFICE (OUTPATIENT)
Dept: URBAN - METROPOLITAN AREA CLINIC 121 | Facility: CLINIC | Age: 67
Setting detail: OPHTHALMOLOGY
End: 2022-12-19
Payer: MEDICAID

## 2022-12-19 DIAGNOSIS — H40.013: ICD-10-CM

## 2022-12-19 PROCEDURE — 92133 CPTRZD OPH DX IMG PST SGM ON: CPT | Performed by: OPHTHALMOLOGY

## 2022-12-19 PROCEDURE — 92083 EXTENDED VISUAL FIELD XM: CPT | Performed by: OPHTHALMOLOGY

## 2022-12-19 PROCEDURE — 99212 OFFICE O/P EST SF 10 MIN: CPT | Performed by: OPHTHALMOLOGY

## 2022-12-19 ASSESSMENT — REFRACTION_CURRENTRX
OD_ADD: +2.00
OS_CYLINDER: +4.00
OD_CYLINDER: +2.50
OD_AXIS: 004
OS_ADD: +2.00
OS_SPHERE: -0.50
OS_OVR_VA: 20/
OD_SPHERE: PLANO
OD_OVR_VA: 20/
OS_AXIS: 010

## 2022-12-19 ASSESSMENT — KERATOMETRY
OD_K1POWER_DIOPTERS: 44.50
OS_AXISANGLE_DEGREES: 174
OS_K2POWER_DIOPTERS: 46.50
OD_K2POWER_DIOPTERS: 46.00
METHOD_AUTO_MANUAL: AUTO
OD_AXISANGLE_DEGREES: 018
OS_K1POWER_DIOPTERS: 45.25

## 2022-12-19 ASSESSMENT — VISUAL ACUITY
OD_BCVA: 20/30
OS_BCVA: 20/50

## 2022-12-19 ASSESSMENT — SPHEQUIV_DERIVED
OD_SPHEQUIV: -0.75
OS_SPHEQUIV: -0.5

## 2022-12-19 ASSESSMENT — AXIALLENGTH_DERIVED
OD_AL: 23.2465
OS_AL: 22.9333

## 2022-12-19 ASSESSMENT — CONFRONTATIONAL VISUAL FIELD TEST (CVF)
OD_FINDINGS: FULL
OS_FINDINGS: FULL

## 2022-12-19 ASSESSMENT — SUPERFICIAL PUNCTATE KERATITIS (SPK)
OS_SPK: T
OD_SPK: T

## 2022-12-19 ASSESSMENT — REFRACTION_AUTOREFRACTION
OD_AXIS: 010
OS_AXIS: 172
OS_SPHERE: -1.25
OD_CYLINDER: +2.50
OS_CYLINDER: +1.50
OD_SPHERE: -2.00